# Patient Record
Sex: FEMALE | Race: WHITE | NOT HISPANIC OR LATINO | URBAN - METROPOLITAN AREA
[De-identification: names, ages, dates, MRNs, and addresses within clinical notes are randomized per-mention and may not be internally consistent; named-entity substitution may affect disease eponyms.]

---

## 2018-04-12 ENCOUNTER — OUTPATIENT (OUTPATIENT)
Dept: OUTPATIENT SERVICES | Facility: HOSPITAL | Age: 58
LOS: 1 days | End: 2018-04-12
Payer: COMMERCIAL

## 2018-04-12 ENCOUNTER — APPOINTMENT (OUTPATIENT)
Dept: MAMMOGRAPHY | Facility: CLINIC | Age: 58
End: 2018-04-12
Payer: COMMERCIAL

## 2018-04-12 ENCOUNTER — APPOINTMENT (OUTPATIENT)
Dept: ULTRASOUND IMAGING | Facility: CLINIC | Age: 58
End: 2018-04-12
Payer: COMMERCIAL

## 2018-04-12 DIAGNOSIS — Z00.8 ENCOUNTER FOR OTHER GENERAL EXAMINATION: ICD-10-CM

## 2018-04-12 PROCEDURE — 76641 ULTRASOUND BREAST COMPLETE: CPT

## 2018-04-12 PROCEDURE — 76641 ULTRASOUND BREAST COMPLETE: CPT | Mod: 26,50

## 2018-04-12 PROCEDURE — 77067 SCR MAMMO BI INCL CAD: CPT | Mod: 26

## 2018-04-12 PROCEDURE — 77063 BREAST TOMOSYNTHESIS BI: CPT

## 2018-04-12 PROCEDURE — 77063 BREAST TOMOSYNTHESIS BI: CPT | Mod: 26

## 2018-04-12 PROCEDURE — 77067 SCR MAMMO BI INCL CAD: CPT

## 2019-10-31 ENCOUNTER — INPATIENT (INPATIENT)
Facility: HOSPITAL | Age: 59
LOS: 0 days | Discharge: HOME | DRG: 392 | End: 2019-11-01
Attending: EMERGENCY MEDICINE
Payer: COMMERCIAL

## 2019-10-31 VITALS — HEIGHT: 66 IN | WEIGHT: 160.06 LBS

## 2019-10-31 LAB
ALBUMIN SERPL ELPH-MCNC: 4.3 G/DL — SIGNIFICANT CHANGE UP (ref 3.3–5)
ALP SERPL-CCNC: 63 U/L — SIGNIFICANT CHANGE UP (ref 40–120)
ALT FLD-CCNC: 32 U/L — SIGNIFICANT CHANGE UP (ref 12–78)
ANION GAP SERPL CALC-SCNC: 9 MMOL/L — SIGNIFICANT CHANGE UP (ref 5–17)
APTT BLD: 30.2 SEC — SIGNIFICANT CHANGE UP (ref 27.5–36.3)
AST SERPL-CCNC: 16 U/L — SIGNIFICANT CHANGE UP (ref 15–37)
BASOPHILS # BLD AUTO: 0.07 K/UL — SIGNIFICANT CHANGE UP (ref 0–0.2)
BASOPHILS NFR BLD AUTO: 0.4 % — SIGNIFICANT CHANGE UP (ref 0–2)
BILIRUB SERPL-MCNC: 0.8 MG/DL — SIGNIFICANT CHANGE UP (ref 0.2–1.2)
BUN SERPL-MCNC: 19 MG/DL — SIGNIFICANT CHANGE UP (ref 7–23)
CALCIUM SERPL-MCNC: 10 MG/DL — SIGNIFICANT CHANGE UP (ref 8.5–10.1)
CHLORIDE SERPL-SCNC: 103 MMOL/L — SIGNIFICANT CHANGE UP (ref 96–108)
CO2 SERPL-SCNC: 26 MMOL/L — SIGNIFICANT CHANGE UP (ref 22–31)
CREAT SERPL-MCNC: 0.94 MG/DL — SIGNIFICANT CHANGE UP (ref 0.5–1.3)
EOSINOPHIL # BLD AUTO: 0.37 K/UL — SIGNIFICANT CHANGE UP (ref 0–0.5)
EOSINOPHIL NFR BLD AUTO: 2.2 % — SIGNIFICANT CHANGE UP (ref 0–6)
GLUCOSE SERPL-MCNC: 124 MG/DL — HIGH (ref 70–99)
HCT VFR BLD CALC: 44 % — SIGNIFICANT CHANGE UP (ref 34.5–45)
HGB BLD-MCNC: 14.6 G/DL — SIGNIFICANT CHANGE UP (ref 11.5–15.5)
IMM GRANULOCYTES NFR BLD AUTO: 0.4 % — SIGNIFICANT CHANGE UP (ref 0–1.5)
INR BLD: 0.92 RATIO — SIGNIFICANT CHANGE UP (ref 0.88–1.16)
LIDOCAIN IGE QN: 116 U/L — SIGNIFICANT CHANGE UP (ref 73–393)
LYMPHOCYTES # BLD AUTO: 23.4 % — SIGNIFICANT CHANGE UP (ref 13–44)
LYMPHOCYTES # BLD AUTO: 3.91 K/UL — HIGH (ref 1–3.3)
MAGNESIUM SERPL-MCNC: 2.2 MG/DL — SIGNIFICANT CHANGE UP (ref 1.6–2.6)
MCHC RBC-ENTMCNC: 31.1 PG — SIGNIFICANT CHANGE UP (ref 27–34)
MCHC RBC-ENTMCNC: 33.2 GM/DL — SIGNIFICANT CHANGE UP (ref 32–36)
MCV RBC AUTO: 93.6 FL — SIGNIFICANT CHANGE UP (ref 80–100)
MONOCYTES # BLD AUTO: 1.25 K/UL — HIGH (ref 0–0.9)
MONOCYTES NFR BLD AUTO: 7.5 % — SIGNIFICANT CHANGE UP (ref 2–14)
NEUTROPHILS # BLD AUTO: 11.07 K/UL — HIGH (ref 1.8–7.4)
NEUTROPHILS NFR BLD AUTO: 66.1 % — SIGNIFICANT CHANGE UP (ref 43–77)
PLATELET # BLD AUTO: 276 K/UL — SIGNIFICANT CHANGE UP (ref 150–400)
POTASSIUM SERPL-MCNC: 3.6 MMOL/L — SIGNIFICANT CHANGE UP (ref 3.5–5.3)
POTASSIUM SERPL-SCNC: 3.6 MMOL/L — SIGNIFICANT CHANGE UP (ref 3.5–5.3)
PROT SERPL-MCNC: 7.9 GM/DL — SIGNIFICANT CHANGE UP (ref 6–8.3)
PROTHROM AB SERPL-ACNC: 10.2 SEC — SIGNIFICANT CHANGE UP (ref 10–12.9)
RBC # BLD: 4.7 M/UL — SIGNIFICANT CHANGE UP (ref 3.8–5.2)
RBC # FLD: 11.9 % — SIGNIFICANT CHANGE UP (ref 10.3–14.5)
SODIUM SERPL-SCNC: 138 MMOL/L — SIGNIFICANT CHANGE UP (ref 135–145)
TROPONIN I SERPL-MCNC: <0.015 NG/ML — SIGNIFICANT CHANGE UP (ref 0.01–0.04)
WBC # BLD: 16.74 K/UL — HIGH (ref 3.8–10.5)
WBC # FLD AUTO: 16.74 K/UL — HIGH (ref 3.8–10.5)

## 2019-10-31 PROCEDURE — 76705 ECHO EXAM OF ABDOMEN: CPT | Mod: 26

## 2019-10-31 PROCEDURE — 74177 CT ABD & PELVIS W/CONTRAST: CPT | Mod: 26

## 2019-10-31 PROCEDURE — 71046 X-RAY EXAM CHEST 2 VIEWS: CPT | Mod: 26

## 2019-10-31 PROCEDURE — 93010 ELECTROCARDIOGRAM REPORT: CPT

## 2019-10-31 RX ORDER — ONDANSETRON 8 MG/1
4 TABLET, FILM COATED ORAL ONCE
Refills: 0 | Status: COMPLETED | OUTPATIENT
Start: 2019-10-31 | End: 2019-10-31

## 2019-10-31 RX ORDER — MORPHINE SULFATE 50 MG/1
4 CAPSULE, EXTENDED RELEASE ORAL ONCE
Refills: 0 | Status: DISCONTINUED | OUTPATIENT
Start: 2019-10-31 | End: 2019-10-31

## 2019-10-31 RX ORDER — SODIUM CHLORIDE 9 MG/ML
1000 INJECTION INTRAMUSCULAR; INTRAVENOUS; SUBCUTANEOUS ONCE
Refills: 0 | Status: COMPLETED | OUTPATIENT
Start: 2019-10-31 | End: 2019-10-31

## 2019-10-31 RX ADMIN — MORPHINE SULFATE 4 MILLIGRAM(S): 50 CAPSULE, EXTENDED RELEASE ORAL at 23:00

## 2019-10-31 RX ADMIN — MORPHINE SULFATE 4 MILLIGRAM(S): 50 CAPSULE, EXTENDED RELEASE ORAL at 22:27

## 2019-10-31 RX ADMIN — SODIUM CHLORIDE 1000 MILLILITER(S): 9 INJECTION INTRAMUSCULAR; INTRAVENOUS; SUBCUTANEOUS at 22:28

## 2019-10-31 RX ADMIN — ONDANSETRON 4 MILLIGRAM(S): 8 TABLET, FILM COATED ORAL at 22:28

## 2019-10-31 RX ADMIN — SODIUM CHLORIDE 1000 MILLILITER(S): 9 INJECTION INTRAMUSCULAR; INTRAVENOUS; SUBCUTANEOUS at 23:28

## 2019-10-31 NOTE — ED PROVIDER NOTE - CARE PLAN
Principal Discharge DX:	SBO (small bowel obstruction)  Secondary Diagnosis:	Pelvic fluid collection  Secondary Diagnosis:	Leukocytosis

## 2019-10-31 NOTE — ED PROVIDER NOTE - NS ED ROS FT
Review of Systems:  	•	CONSTITUTIONAL: no fever  	•	SKIN: no rash  	•	RESPIRATORY: no shortness of breath  	•	CARDIAC: no chest pain, no palpitations  	•	GI:  abd pain, nausea, vomiting, no diarrhea  	•	GENITO-URINARY:  no dysuria; no hematuria    	•	MUSCULOSKELETAL:  no back pain  	•	NEUROLOGIC: no weakness  	•	ALLERGY: no rhinitis  	•	PSYSCHIATRIC: no anxiety

## 2019-10-31 NOTE — ED PROVIDER NOTE - PHYSICAL EXAMINATION
*GEN: No acute distress, well appearing; A+O x3   *HEAD: Normocephalic, Atraumatic  *EYES/NOSE: PERRL & EOMI b/l  *THROAT: airway patent, moist mucous membranes  *NECK: Neck supple, no masses  *PULMONARY: CTA b/l, symmetric breath sounds.   *CARDIAC: s1s2, regular rhythm, no Murmur  *ABDOMEN:  diffusely Tender, Non Distended, soft, no guarding, no rebound, no masses   *BACK: no CVA tenderness, Normal  spine   *EXTREMITIES: symmetric pulses, 2+ dp & radial pulses, capillary refill < 2 seconds, no cyanosis, no edema   *SKIN: no rash or bruising   *NEUROLOGIC: alert, CN 2-12 intact, moves all 4 extremities, full active & passive ROM in all extremities, normal baseline gait  *PSYCH: insight and judgment nl, memory nl, affect nl, thought nl

## 2019-10-31 NOTE — ED ADULT NURSE NOTE - OBJECTIVE STATEMENT
pt reports to ED with sudden onset of shooting abdominal pain, pt states pain is associated with nausea and vomiting, pt denies diarrhea, pt denies changes in her diet, pt has no other medical complaints at this time

## 2019-10-31 NOTE — ED PROVIDER NOTE - OBJECTIVE STATEMENT
Pertinent HPI/PMH/PSH/FHx/SHx and Review of Systems contained within  HPI:  59yoF  p/w CC abdominal pain x1day, diffuse, intermittent, cramping. also having n/v. no bm   PMH/PSH relevant for: none  ROS negative for: fever, Chest pain, SOB, , diarrhea, , dysuria    FamilyHx and SocialHx not otherwise contributory

## 2019-10-31 NOTE — ED ADULT TRIAGE NOTE - CHIEF COMPLAINT QUOTE
sudden onset of stabbing abdominal pain starting at 730PM with three episodes of vomiting, last episodes just prior to arrival.

## 2019-10-31 NOTE — ED ADULT NURSE NOTE - NSIMPLEMENTINTERV_GEN_ALL_ED
Implemented All Universal Safety Interventions:  [place] to call system. Call bell, personal items and telephone within reach. Instruct patient to call for assistance. Room bathroom lighting operational. Non-slip footwear when patient is off stretcher. Physically safe environment: no spills, clutter or unnecessary equipment. Stretcher in lowest position, wheels locked, appropriate side rails in place.

## 2019-11-01 ENCOUNTER — TRANSCRIPTION ENCOUNTER (OUTPATIENT)
Age: 59
End: 2019-11-01

## 2019-11-01 VITALS
OXYGEN SATURATION: 97 % | HEART RATE: 64 BPM | DIASTOLIC BLOOD PRESSURE: 68 MMHG | TEMPERATURE: 97 F | RESPIRATION RATE: 18 BRPM | SYSTOLIC BLOOD PRESSURE: 100 MMHG

## 2019-11-01 DIAGNOSIS — K56.609 UNSPECIFIED INTESTINAL OBSTRUCTION, UNSPECIFIED AS TO PARTIAL VERSUS COMPLETE OBSTRUCTION: ICD-10-CM

## 2019-11-01 DIAGNOSIS — D72.829 ELEVATED WHITE BLOOD CELL COUNT, UNSPECIFIED: ICD-10-CM

## 2019-11-01 LAB
ANION GAP SERPL CALC-SCNC: 6 MMOL/L — SIGNIFICANT CHANGE UP (ref 5–17)
APPEARANCE UR: CLEAR — SIGNIFICANT CHANGE UP
BASOPHILS # BLD AUTO: 0.04 K/UL — SIGNIFICANT CHANGE UP (ref 0–0.2)
BASOPHILS NFR BLD AUTO: 0.5 % — SIGNIFICANT CHANGE UP (ref 0–2)
BILIRUB UR-MCNC: NEGATIVE — SIGNIFICANT CHANGE UP
BUN SERPL-MCNC: 13 MG/DL — SIGNIFICANT CHANGE UP (ref 7–23)
CALCIUM SERPL-MCNC: 8.5 MG/DL — SIGNIFICANT CHANGE UP (ref 8.5–10.1)
CHLORIDE SERPL-SCNC: 109 MMOL/L — HIGH (ref 96–108)
CO2 SERPL-SCNC: 27 MMOL/L — SIGNIFICANT CHANGE UP (ref 22–31)
COLOR SPEC: YELLOW — SIGNIFICANT CHANGE UP
CREAT SERPL-MCNC: 0.75 MG/DL — SIGNIFICANT CHANGE UP (ref 0.5–1.3)
DIFF PNL FLD: ABNORMAL
EOSINOPHIL # BLD AUTO: 0.23 K/UL — SIGNIFICANT CHANGE UP (ref 0–0.5)
EOSINOPHIL NFR BLD AUTO: 2.9 % — SIGNIFICANT CHANGE UP (ref 0–6)
GLUCOSE SERPL-MCNC: 99 MG/DL — SIGNIFICANT CHANGE UP (ref 70–99)
GLUCOSE UR QL: NEGATIVE MG/DL — SIGNIFICANT CHANGE UP
HCT VFR BLD CALC: 37.7 % — SIGNIFICANT CHANGE UP (ref 34.5–45)
HCV AB S/CO SERPL IA: 0.08 S/CO — SIGNIFICANT CHANGE UP (ref 0–0.99)
HCV AB SERPL-IMP: SIGNIFICANT CHANGE UP
HGB BLD-MCNC: 12.1 G/DL — SIGNIFICANT CHANGE UP (ref 11.5–15.5)
IMM GRANULOCYTES NFR BLD AUTO: 0.4 % — SIGNIFICANT CHANGE UP (ref 0–1.5)
KETONES UR-MCNC: ABNORMAL
LACTATE SERPL-SCNC: 1.6 MMOL/L — SIGNIFICANT CHANGE UP (ref 0.7–2)
LEUKOCYTE ESTERASE UR-ACNC: ABNORMAL
LYMPHOCYTES # BLD AUTO: 2.66 K/UL — SIGNIFICANT CHANGE UP (ref 1–3.3)
LYMPHOCYTES # BLD AUTO: 33.4 % — SIGNIFICANT CHANGE UP (ref 13–44)
MAGNESIUM SERPL-MCNC: 2.4 MG/DL — SIGNIFICANT CHANGE UP (ref 1.6–2.6)
MCHC RBC-ENTMCNC: 30.9 PG — SIGNIFICANT CHANGE UP (ref 27–34)
MCHC RBC-ENTMCNC: 32.1 GM/DL — SIGNIFICANT CHANGE UP (ref 32–36)
MCV RBC AUTO: 96.4 FL — SIGNIFICANT CHANGE UP (ref 80–100)
MONOCYTES # BLD AUTO: 0.88 K/UL — SIGNIFICANT CHANGE UP (ref 0–0.9)
MONOCYTES NFR BLD AUTO: 11 % — SIGNIFICANT CHANGE UP (ref 2–14)
NEUTROPHILS # BLD AUTO: 4.13 K/UL — SIGNIFICANT CHANGE UP (ref 1.8–7.4)
NEUTROPHILS NFR BLD AUTO: 51.8 % — SIGNIFICANT CHANGE UP (ref 43–77)
NITRITE UR-MCNC: NEGATIVE — SIGNIFICANT CHANGE UP
PH UR: 5 — SIGNIFICANT CHANGE UP (ref 5–8)
PHOSPHATE SERPL-MCNC: 3.5 MG/DL — SIGNIFICANT CHANGE UP (ref 2.5–4.5)
PLATELET # BLD AUTO: 237 K/UL — SIGNIFICANT CHANGE UP (ref 150–400)
POTASSIUM SERPL-MCNC: 4 MMOL/L — SIGNIFICANT CHANGE UP (ref 3.5–5.3)
POTASSIUM SERPL-SCNC: 4 MMOL/L — SIGNIFICANT CHANGE UP (ref 3.5–5.3)
PROT UR-MCNC: 30 MG/DL
RBC # BLD: 3.91 M/UL — SIGNIFICANT CHANGE UP (ref 3.8–5.2)
RBC # FLD: 12.2 % — SIGNIFICANT CHANGE UP (ref 10.3–14.5)
SODIUM SERPL-SCNC: 142 MMOL/L — SIGNIFICANT CHANGE UP (ref 135–145)
SP GR SPEC: 1.02 — SIGNIFICANT CHANGE UP (ref 1.01–1.02)
UROBILINOGEN FLD QL: NEGATIVE MG/DL — SIGNIFICANT CHANGE UP
WBC # BLD: 7.97 K/UL — SIGNIFICANT CHANGE UP (ref 3.8–10.5)
WBC # FLD AUTO: 7.97 K/UL — SIGNIFICANT CHANGE UP (ref 3.8–10.5)

## 2019-11-01 PROCEDURE — 85025 COMPLETE CBC W/AUTO DIFF WBC: CPT

## 2019-11-01 PROCEDURE — 80048 BASIC METABOLIC PNL TOTAL CA: CPT

## 2019-11-01 PROCEDURE — 74176 CT ABD & PELVIS W/O CONTRAST: CPT

## 2019-11-01 PROCEDURE — 84100 ASSAY OF PHOSPHORUS: CPT

## 2019-11-01 PROCEDURE — 86803 HEPATITIS C AB TEST: CPT

## 2019-11-01 PROCEDURE — 36415 COLL VENOUS BLD VENIPUNCTURE: CPT

## 2019-11-01 PROCEDURE — 99223 1ST HOSP IP/OBS HIGH 75: CPT

## 2019-11-01 PROCEDURE — 76830 TRANSVAGINAL US NON-OB: CPT | Mod: 26

## 2019-11-01 PROCEDURE — 74176 CT ABD & PELVIS W/O CONTRAST: CPT | Mod: 26

## 2019-11-01 PROCEDURE — 83735 ASSAY OF MAGNESIUM: CPT

## 2019-11-01 RX ORDER — FAMOTIDINE 10 MG/ML
20 INJECTION INTRAVENOUS ONCE
Refills: 0 | Status: COMPLETED | OUTPATIENT
Start: 2019-11-01 | End: 2019-11-01

## 2019-11-01 RX ORDER — ACETAMINOPHEN 500 MG
650 TABLET ORAL EVERY 6 HOURS
Refills: 0 | Status: DISCONTINUED | OUTPATIENT
Start: 2019-11-01 | End: 2019-11-01

## 2019-11-01 RX ORDER — DEXTROSE MONOHYDRATE, SODIUM CHLORIDE, AND POTASSIUM CHLORIDE 50; .745; 4.5 G/1000ML; G/1000ML; G/1000ML
1000 INJECTION, SOLUTION INTRAVENOUS
Refills: 0 | Status: DISCONTINUED | OUTPATIENT
Start: 2019-11-01 | End: 2019-11-01

## 2019-11-01 RX ORDER — ACETAMINOPHEN 500 MG
975 TABLET ORAL ONCE
Refills: 0 | Status: COMPLETED | OUTPATIENT
Start: 2019-11-01 | End: 2019-11-01

## 2019-11-01 RX ORDER — MORPHINE SULFATE 50 MG/1
4 CAPSULE, EXTENDED RELEASE ORAL ONCE
Refills: 0 | Status: DISCONTINUED | OUTPATIENT
Start: 2019-11-01 | End: 2019-11-01

## 2019-11-01 RX ORDER — ENOXAPARIN SODIUM 100 MG/ML
40 INJECTION SUBCUTANEOUS DAILY
Refills: 0 | Status: DISCONTINUED | OUTPATIENT
Start: 2019-11-01 | End: 2019-11-01

## 2019-11-01 RX ADMIN — Medication 975 MILLIGRAM(S): at 00:19

## 2019-11-01 RX ADMIN — MORPHINE SULFATE 4 MILLIGRAM(S): 50 CAPSULE, EXTENDED RELEASE ORAL at 00:19

## 2019-11-01 RX ADMIN — FAMOTIDINE 20 MILLIGRAM(S): 10 INJECTION INTRAVENOUS at 00:19

## 2019-11-01 RX ADMIN — Medication 975 MILLIGRAM(S): at 01:19

## 2019-11-01 RX ADMIN — MORPHINE SULFATE 4 MILLIGRAM(S): 50 CAPSULE, EXTENDED RELEASE ORAL at 00:45

## 2019-11-01 NOTE — H&P ADULT - NSHPLABSRESULTS_GEN_ALL_CORE
WBC = 17  HCO3 = 26    CT abd/pelvis w/ contrast - WBC = 17  HCO3 = 26    CT abd/pelvis w/ contrast - SBO with the following findings concerning for closed-loop:  1. relatively collapsed stomach and proximal jejunum  2. moderate volume of pelvic ascites and some mesenteric edema  3. two transition points (proximal is axial 39, sagittal 90, coronal 27) (distal is axial 92, sagittal 55, coronal 50) WBC = 17  HCO3 = 26  lactate = 1.6    CT abd/pelvis w/ contrast - SBO with the following findings concerning for closed-loop:  1. relatively collapsed stomach and proximal jejunum  2. moderate volume of pelvic ascites and some mesenteric edema  3. two transition points (proximal is axial 39, sagittal 90, coronal 27) (distal is axial 92, sagittal 55, coronal 50)

## 2019-11-01 NOTE — PROGRESS NOTE ADULT - SUBJECTIVE AND OBJECTIVE BOX
CC: Patient is a 59y old  Female who presents with a chief complaint of small bowel obstruction (01 Nov 2019 07:24)      Subjective:  tolerating clears w/o nausea or vomiting  significant amount of flatus this morning  no recurrence of abdominal pain      Physical Exam:  Vital Signs Last 24 Hrs  T(C): 36.2 (01 Nov 2019 07:34), Max: 37.1 (31 Oct 2019 22:07)  T(F): 97.1 (01 Nov 2019 07:34), Max: 98.7 (31 Oct 2019 22:07)  HR: 64 (01 Nov 2019 07:34) (64 - 89)  BP: 100/68 (01 Nov 2019 07:34) (100/68 - 146/86)  BP(mean): --  RR: 18 (01 Nov 2019 07:34) (18 - 18)  SpO2: 97% (01 Nov 2019 07:34) (97% - 98%)    soft / NT / ND      Labs:                        12.1   7.97  )-----------( 237      ( 01 Nov 2019 08:00 )             37.7     PT/INR - ( 31 Oct 2019 22:30 )   PT: 10.2 sec;   INR: 0.92 ratio         PTT - ( 31 Oct 2019 22:30 )  PTT:30.2 sec  11-01    142  |  109<H>  |  13  ----------------------------<  99  4.0   |  27  |  0.75    Ca    8.5      01 Nov 2019 08:00  Phos  3.5     11-01  Mg     2.4     11-01    TPro  7.9  /  Alb  4.3  /  TBili  0.8  /  DBili  x   /  AST  16  /  ALT  32  /  AlkPhos  63  10-31    LIVER FUNCTIONS - ( 31 Oct 2019 22:30 )  Alb: 4.3 g/dL / Pro: 7.9 gm/dL / ALK PHOS: 63 U/L / ALT: 32 U/L / AST: 16 U/L / GGT: x           Bilirubin Total, Serum: 0.8 mg/dL (10-31-19 @ 22:30)        Microbiology:      Radiology:  CT abd/pelvis w/o contrast - oral contrast has reached colon, and there is no evidence of SBO      Meds:  MEDICATIONS  (STANDING):  enoxaparin Injectable 40 milliGRAM(s) SubCutaneous daily    MEDICATIONS  (PRN):  acetaminophen   Tablet .. 650 milliGRAM(s) Oral every 6 hours PRN Mild Pain (1 - 3)

## 2019-11-01 NOTE — ED ADULT NURSE REASSESSMENT NOTE - NS ED NURSE REASSESS COMMENT FT1
Pt received from previous RN.  Pt aaox3  No c/o pain or discomfort, in no apparent distress.  Plan of care, transition from ED to admitted status discussed with pt.  per dr morse, pt will eat breakfast and, if tolerated, be discharged.  All questions answered to pt satisfaction. breakfast ordered  Call bell given to patient.  No additional needs at this time, will continue hourly rounding.

## 2019-11-01 NOTE — DISCHARGE NOTE PROVIDER - NSDCCPCAREPLAN_GEN_ALL_CORE_FT
PRINCIPAL DISCHARGE DIAGNOSIS  Diagnosis: SBO (small bowel obstruction)  Assessment and Plan of Treatment: this was likely secondary to gastroenteritis and resolved without treatment

## 2019-11-01 NOTE — DISCHARGE NOTE PROVIDER - CARE PROVIDER_API CALL
Riky Plasencia)  Gastroenterology; Internal Medicine  [address] [place] [address]  Phone: [phone]  Fax: [phone]  Follow Up Time: 1-3 days

## 2019-11-01 NOTE — DISCHARGE NOTE NURSING/CASE MANAGEMENT/SOCIAL WORK - PATIENT PORTAL LINK FT
You can access the FollowMyHealth Patient Portal offered by [facility] by registering at the following website: [email or web address]. By joining [employer]’s FollowMyHealth portal, you will also be able to view your health information using other applications (apps) compatible with our system.

## 2019-11-01 NOTE — H&P ADULT - NSHPPHYSICALEXAM_GEN_ALL_CORE
A+Ox3  NAD  RRR w/o murmurs  CTA bilat  soft / NT / ND  no surgical scars on abdomen  hyperactive (nearly constant), but normal pitched bowel sounds  no pedal edema  2+ bilateral radial and DP pulses

## 2019-11-01 NOTE — H&P ADULT - PROBLEM SELECTOR PLAN 1
I explained my concern for closed-loop SBO, but her complete resolution of symptoms 1 hour ago and benign abdominal exam (albeit after morphine) suggest against closed-loop SBO. I offered laparoscopic lysis of adhesions, possible laparotomy, possible bowel resection at this time since the risk of this procedure is negligible compared to delay in treatment of a closed-loop SBO.    She preferred observation. I will stop morphine, have her drink oral contrast and repeat CT abd/pelvis w/o contrast 2 hours after completing the oral contrast to better evaluate for a closed-loop SBO. I provided her with my cell phone number and asked her to call me if she develops recurrent abdominal pain, nausea or vomiting prior to CT scan. I explained my concern for closed-loop SBO, but her complete resolution of symptoms 1 hour ago and benign abdominal exam (albeit after morphine) suggest against closed-loop SBO. She also has no metabolic acidosis and a normal lactate. Despite the inconclusive diagnosis, I offered laparoscopic lysis of adhesions, possible laparotomy, possible bowel resection at this time since the risk of this procedure is negligible compared to delay in treatment of a closed-loop SBO.    She preferred observation. I will stop morphine, have her drink oral contrast and repeat CT abd/pelvis w/o contrast 2 hours after completing the oral contrast to better evaluate for a closed-loop SBO. I provided her with my cell phone number and asked her to call me if she develops recurrent abdominal pain, nausea or vomiting prior to CT scan.

## 2019-11-01 NOTE — PROGRESS NOTE ADULT - ASSESSMENT
SBO symptoms and CT scan findings were most likely secondary to viral gastroenteritis  -discharge home if she tolerates a regular diet

## 2019-11-01 NOTE — H&P ADULT - HISTORY OF PRESENT ILLNESS
seen and examined 11-01-19 @ 0145    c/o severe crampy epigastric pain at 10 minute frequency since 7pm last night.  associated with non-bloody, non-bilious vomiting.  last BM was yesterday afternoon, but no diarrhea and no history of hematochezia    She got morphine 4 mg IV x2 (2227 and 0019). She has no abdominal pain, nausea or vomiting for the past hour.    Of note:  -She had a dental cleaning yesterday morning and felt unwell after.  -She also had Italian wedding soup at [employer] for lunch.  -She also took a large [employer] Tumeric pill (for right knee arthritis) which she has never taken before.  -She had a cortisone injection in the right knee a few days ago.    She had a normal screening colonoscopy by Dr Riky Plasencia in 2016.

## 2019-11-01 NOTE — DISCHARGE NOTE PROVIDER - HOSPITAL COURSE
60 y/o female with prior abdominal surgery who was admitted on 10/31/2019 for concern of closed-loop SBO on CT abd/pelvis. Her symptoms completely resolved on 11/1 and a CT scan was repeated which demonstrated oral contrast in the colon.

## 2019-11-01 NOTE — H&P ADULT - NSICDXFAMILYHX_GEN_ALL_CORE_FT
FAMILY HISTORY:  FHx: coronary artery disease, father  FHx: lung cancer, mother who smoked cigarettes

## 2019-11-02 LAB
CULTURE RESULTS: SIGNIFICANT CHANGE UP
SPECIMEN SOURCE: SIGNIFICANT CHANGE UP

## 2019-11-04 ENCOUNTER — INBOUND DOCUMENT (OUTPATIENT)
Age: 59
End: 2019-11-04

## 2019-11-04 PROBLEM — M17.11 UNILATERAL PRIMARY OSTEOARTHRITIS, RIGHT KNEE: Chronic | Status: ACTIVE | Noted: 2019-11-01

## 2019-11-05 ENCOUNTER — APPOINTMENT (OUTPATIENT)
Dept: GASTROENTEROLOGY | Facility: CLINIC | Age: 59
End: 2019-11-05
Payer: COMMERCIAL

## 2019-11-05 VITALS
WEIGHT: 160 LBS | DIASTOLIC BLOOD PRESSURE: 77 MMHG | SYSTOLIC BLOOD PRESSURE: 120 MMHG | HEART RATE: 69 BPM | HEIGHT: 66.5 IN | BODY MASS INDEX: 25.41 KG/M2

## 2019-11-05 DIAGNOSIS — Z80.1 FAMILY HISTORY OF MALIGNANT NEOPLASM OF TRACHEA, BRONCHUS AND LUNG: ICD-10-CM

## 2019-11-05 DIAGNOSIS — R10.30 LOWER ABDOMINAL PAIN, UNSPECIFIED: ICD-10-CM

## 2019-11-05 DIAGNOSIS — Z78.9 OTHER SPECIFIED HEALTH STATUS: ICD-10-CM

## 2019-11-05 DIAGNOSIS — R93.5 ABNORMAL FINDINGS ON DIAGNOSTIC IMAGING OF OTHER ABDOMINAL REGIONS, INCLUDING RETROPERITONEUM: ICD-10-CM

## 2019-11-05 DIAGNOSIS — A08.4 VIRAL INTESTINAL INFECTION, UNSPECIFIED: ICD-10-CM

## 2019-11-05 PROCEDURE — 99202 OFFICE O/P NEW SF 15 MIN: CPT

## 2019-11-07 PROBLEM — R10.30 LOWER ABDOMINAL PAIN: Status: ACTIVE | Noted: 2019-11-07

## 2019-11-07 PROBLEM — R93.5 ABNORMAL CT OF THE ABDOMEN: Status: ACTIVE | Noted: 2019-11-07

## 2019-11-07 PROBLEM — Z78.9 CAFFEINE USE: Status: ACTIVE | Noted: 2019-11-05

## 2019-11-07 PROBLEM — Z80.1 FAMILY HISTORY OF LUNG CANCER: Status: ACTIVE | Noted: 2019-11-05

## 2019-11-07 NOTE — REVIEW OF SYSTEMS
[Abdominal Pain] : abdominal pain [As Noted in HPI] : as noted in HPI [Diarrhea] : diarrhea [Negative] : Heme/Lymph

## 2019-11-07 NOTE — PHYSICAL EXAM
[General Appearance - Alert] : alert [General Appearance - In No Acute Distress] : in no acute distress [Sclera] : the sclera and conjunctiva were normal [PERRL With Normal Accommodation] : pupils were equal in size, round, and reactive to light [Oropharynx] : the oropharynx was normal [Outer Ear] : the ears and nose were normal in appearance [Extraocular Movements] : extraocular movements were intact [Neck Appearance] : the appearance of the neck was normal [Neck Cervical Mass (___cm)] : no neck mass was observed [Jugular Venous Distention Increased] : there was no jugular-venous distention [Thyroid Diffuse Enlargement] : the thyroid was not enlarged [Thyroid Nodule] : there were no palpable thyroid nodules [Auscultation Breath Sounds / Voice Sounds] : lungs were clear to auscultation bilaterally [Heart Rate And Rhythm] : heart rate was normal and rhythm regular [Heart Sounds] : normal S1 and S2 [Murmurs] : no murmurs [Heart Sounds Gallop] : no gallops [Heart Sounds Pericardial Friction Rub] : no pericardial rub [Bowel Sounds] : normal bowel sounds [Abdomen Tenderness] : non-tender [] : no hepato-splenomegaly [Abdomen Soft] : soft [Abdomen Mass (___ Cm)] : no abdominal mass palpated [Nail Clubbing] : no clubbing  or cyanosis of the fingernails [Abnormal Walk] : normal gait [Musculoskeletal - Swelling] : no joint swelling seen [Motor Tone] : muscle strength and tone were normal [Impaired Insight] : insight and judgment were intact [Oriented To Time, Place, And Person] : oriented to person, place, and time [Affect] : the affect was normal

## 2019-11-07 NOTE — HISTORY OF PRESENT ILLNESS
[de-identified] : 58yo female seen for evaluation s/p urgent ER visit\par Last week, she developed sudden onset of severe lower abdominal pain 10/10 with associated bloating nausea and vomiting.  She went to the ER and initially diagnosed with small bowel obstruction.  She started to feel better and repeat CT scan was normal\par She did have leukocytosis\par \par She had diarrhea for few days after but now largely improved with only mild residual soreness\par There were no clear inciting factors.

## 2019-11-12 ENCOUNTER — INBOUND DOCUMENT (OUTPATIENT)
Age: 59
End: 2019-11-12

## 2021-04-30 ENCOUNTER — APPOINTMENT (OUTPATIENT)
Dept: FAMILY MEDICINE | Facility: CLINIC | Age: 61
End: 2021-04-30
Payer: COMMERCIAL

## 2021-04-30 VITALS
HEIGHT: 66 IN | SYSTOLIC BLOOD PRESSURE: 120 MMHG | WEIGHT: 160 LBS | TEMPERATURE: 96.3 F | HEART RATE: 67 BPM | BODY MASS INDEX: 25.71 KG/M2 | OXYGEN SATURATION: 98 % | DIASTOLIC BLOOD PRESSURE: 80 MMHG

## 2021-04-30 DIAGNOSIS — J30.9 ALLERGIC RHINITIS, UNSPECIFIED: ICD-10-CM

## 2021-04-30 PROCEDURE — 99213 OFFICE O/P EST LOW 20 MIN: CPT | Mod: 25

## 2021-04-30 PROCEDURE — 99072 ADDL SUPL MATRL&STAF TM PHE: CPT

## 2021-04-30 PROCEDURE — 96372 THER/PROPH/DIAG INJ SC/IM: CPT

## 2021-04-30 RX ADMIN — TRIAMCINOLONE ACETONIDE 0 MG/ML: 40 INJECTION, SUSPENSION INTRA-ARTICULAR; INTRAMUSCULAR at 00:00

## 2021-04-30 NOTE — PLAN
[FreeTextEntry1] : \par \par Advised to continue with her Antihistamine protocol , I have added Patanase , and administered Kenalog IM today \par

## 2021-04-30 NOTE — HISTORY OF PRESENT ILLNESS
[FreeTextEntry8] : Patient is here with complaint of seasonal allergies, SHe is experiencing Allergic Rhinitis , PND , watery and itchy eyes ,as well as eye edema in last 2 days \par She is currently taking oral antihistamine, saline and flonase spray, patanol eye drops, benadryl at bedtime.  She states that she has had worsening symptoms despite all her treatments\par She is requesting steroids as this has helped in past

## 2021-04-30 NOTE — REVIEW OF SYSTEMS
soft tissue and bone [Discharge] : discharge [Redness] : redness [Nasal Discharge] : nasal discharge [Postnasal Drip] : postnasal drip [Negative] : Gastrointestinal

## 2021-04-30 NOTE — PHYSICAL EXAM
[Normal] : no posterior cervical lymphadenopathy and no anterior cervical lymphadenopathy [de-identified] : edematous mucousa ,

## 2021-05-20 RX ORDER — TRIAMCINOLONE ACETONIDE 40 MG/ML
40 SUSPENSION INTRA-ARTERIAL; INTRAMUSCULAR
Qty: 1 | Refills: 0 | Status: COMPLETED | OUTPATIENT
Start: 2021-04-30

## 2022-02-08 ENCOUNTER — APPOINTMENT (OUTPATIENT)
Dept: ULTRASOUND IMAGING | Facility: CLINIC | Age: 62
End: 2022-02-08
Payer: COMMERCIAL

## 2022-02-08 ENCOUNTER — APPOINTMENT (OUTPATIENT)
Dept: MAMMOGRAPHY | Facility: CLINIC | Age: 62
End: 2022-02-08
Payer: COMMERCIAL

## 2022-02-08 ENCOUNTER — OUTPATIENT (OUTPATIENT)
Dept: OUTPATIENT SERVICES | Facility: HOSPITAL | Age: 62
LOS: 1 days | End: 2022-02-08
Payer: COMMERCIAL

## 2022-02-08 ENCOUNTER — APPOINTMENT (OUTPATIENT)
Dept: RADIOLOGY | Facility: CLINIC | Age: 62
End: 2022-02-08
Payer: COMMERCIAL

## 2022-02-08 DIAGNOSIS — Z01.419 ENCOUNTER FOR GYNECOLOGICAL EXAMINATION (GENERAL) (ROUTINE) WITHOUT ABNORMAL FINDINGS: ICD-10-CM

## 2022-02-08 PROCEDURE — 77067 SCR MAMMO BI INCL CAD: CPT | Mod: 26

## 2022-02-08 PROCEDURE — 77063 BREAST TOMOSYNTHESIS BI: CPT

## 2022-02-08 PROCEDURE — 77063 BREAST TOMOSYNTHESIS BI: CPT | Mod: 26

## 2022-02-08 PROCEDURE — 77080 DXA BONE DENSITY AXIAL: CPT | Mod: 26

## 2022-02-08 PROCEDURE — 76641 ULTRASOUND BREAST COMPLETE: CPT | Mod: 26,50

## 2022-02-08 PROCEDURE — 76641 ULTRASOUND BREAST COMPLETE: CPT

## 2022-02-08 PROCEDURE — 77067 SCR MAMMO BI INCL CAD: CPT

## 2022-02-08 PROCEDURE — 77080 DXA BONE DENSITY AXIAL: CPT

## 2022-07-18 ENCOUNTER — NON-APPOINTMENT (OUTPATIENT)
Age: 62
End: 2022-07-18

## 2022-07-19 ENCOUNTER — NON-APPOINTMENT (OUTPATIENT)
Age: 62
End: 2022-07-19

## 2022-07-19 ENCOUNTER — EMERGENCY (EMERGENCY)
Facility: HOSPITAL | Age: 62
LOS: 0 days | Discharge: HOME | End: 2022-07-19
Attending: EMERGENCY MEDICINE
Payer: COMMERCIAL

## 2022-07-19 VITALS — HEIGHT: 66 IN | WEIGHT: 160.06 LBS

## 2022-07-19 VITALS
SYSTOLIC BLOOD PRESSURE: 114 MMHG | TEMPERATURE: 98 F | OXYGEN SATURATION: 98 % | HEART RATE: 68 BPM | RESPIRATION RATE: 18 BRPM | DIASTOLIC BLOOD PRESSURE: 79 MMHG

## 2022-07-19 DIAGNOSIS — R42 DIZZINESS AND GIDDINESS: ICD-10-CM

## 2022-07-19 DIAGNOSIS — R07.9 CHEST PAIN, UNSPECIFIED: ICD-10-CM

## 2022-07-19 DIAGNOSIS — M17.11 UNILATERAL PRIMARY OSTEOARTHRITIS, RIGHT KNEE: ICD-10-CM

## 2022-07-19 DIAGNOSIS — R51.9 HEADACHE, UNSPECIFIED: ICD-10-CM

## 2022-07-19 LAB
ALBUMIN SERPL ELPH-MCNC: 4 G/DL — SIGNIFICANT CHANGE UP (ref 3.3–5)
ALP SERPL-CCNC: 52 U/L — SIGNIFICANT CHANGE UP (ref 40–120)
ALT FLD-CCNC: 41 U/L — SIGNIFICANT CHANGE UP (ref 12–78)
ANION GAP SERPL CALC-SCNC: 5 MMOL/L — SIGNIFICANT CHANGE UP (ref 5–17)
APPEARANCE UR: CLEAR — SIGNIFICANT CHANGE UP
AST SERPL-CCNC: 20 U/L — SIGNIFICANT CHANGE UP (ref 15–37)
BASOPHILS # BLD AUTO: 0.06 K/UL — SIGNIFICANT CHANGE UP (ref 0–0.2)
BASOPHILS NFR BLD AUTO: 0.9 % — SIGNIFICANT CHANGE UP (ref 0–2)
BILIRUB SERPL-MCNC: 0.5 MG/DL — SIGNIFICANT CHANGE UP (ref 0.2–1.2)
BILIRUB UR-MCNC: NEGATIVE — SIGNIFICANT CHANGE UP
BUN SERPL-MCNC: 21 MG/DL — SIGNIFICANT CHANGE UP (ref 7–23)
CALCIUM SERPL-MCNC: 9.3 MG/DL — SIGNIFICANT CHANGE UP (ref 8.5–10.1)
CHLORIDE SERPL-SCNC: 107 MMOL/L — SIGNIFICANT CHANGE UP (ref 96–108)
CO2 SERPL-SCNC: 25 MMOL/L — SIGNIFICANT CHANGE UP (ref 22–31)
COLOR SPEC: YELLOW — SIGNIFICANT CHANGE UP
CREAT SERPL-MCNC: 0.83 MG/DL — SIGNIFICANT CHANGE UP (ref 0.5–1.3)
DIFF PNL FLD: NEGATIVE — SIGNIFICANT CHANGE UP
EGFR: 80 ML/MIN/1.73M2 — SIGNIFICANT CHANGE UP
EOSINOPHIL # BLD AUTO: 0.14 K/UL — SIGNIFICANT CHANGE UP (ref 0–0.5)
EOSINOPHIL NFR BLD AUTO: 2 % — SIGNIFICANT CHANGE UP (ref 0–6)
GLUCOSE SERPL-MCNC: 108 MG/DL — HIGH (ref 70–99)
GLUCOSE UR QL: NEGATIVE — SIGNIFICANT CHANGE UP
HCT VFR BLD CALC: 40.3 % — SIGNIFICANT CHANGE UP (ref 34.5–45)
HGB BLD-MCNC: 13.6 G/DL — SIGNIFICANT CHANGE UP (ref 11.5–15.5)
IMM GRANULOCYTES NFR BLD AUTO: 0.3 % — SIGNIFICANT CHANGE UP (ref 0–1.5)
KETONES UR-MCNC: NEGATIVE — SIGNIFICANT CHANGE UP
LEUKOCYTE ESTERASE UR-ACNC: NEGATIVE — SIGNIFICANT CHANGE UP
LYMPHOCYTES # BLD AUTO: 2.31 K/UL — SIGNIFICANT CHANGE UP (ref 1–3.3)
LYMPHOCYTES # BLD AUTO: 33.6 % — SIGNIFICANT CHANGE UP (ref 13–44)
MAGNESIUM SERPL-MCNC: 2.4 MG/DL — SIGNIFICANT CHANGE UP (ref 1.6–2.6)
MCHC RBC-ENTMCNC: 31.8 PG — SIGNIFICANT CHANGE UP (ref 27–34)
MCHC RBC-ENTMCNC: 33.7 GM/DL — SIGNIFICANT CHANGE UP (ref 32–36)
MCV RBC AUTO: 94.2 FL — SIGNIFICANT CHANGE UP (ref 80–100)
MONOCYTES # BLD AUTO: 0.7 K/UL — SIGNIFICANT CHANGE UP (ref 0–0.9)
MONOCYTES NFR BLD AUTO: 10.2 % — SIGNIFICANT CHANGE UP (ref 2–14)
NEUTROPHILS # BLD AUTO: 3.64 K/UL — SIGNIFICANT CHANGE UP (ref 1.8–7.4)
NEUTROPHILS NFR BLD AUTO: 53 % — SIGNIFICANT CHANGE UP (ref 43–77)
NITRITE UR-MCNC: NEGATIVE — SIGNIFICANT CHANGE UP
PH UR: 5 — SIGNIFICANT CHANGE UP (ref 5–8)
PHOSPHATE SERPL-MCNC: 3.5 MG/DL — SIGNIFICANT CHANGE UP (ref 2.5–4.5)
PLATELET # BLD AUTO: 232 K/UL — SIGNIFICANT CHANGE UP (ref 150–400)
POTASSIUM SERPL-MCNC: 3.9 MMOL/L — SIGNIFICANT CHANGE UP (ref 3.5–5.3)
POTASSIUM SERPL-SCNC: 3.9 MMOL/L — SIGNIFICANT CHANGE UP (ref 3.5–5.3)
PROT SERPL-MCNC: 7.5 GM/DL — SIGNIFICANT CHANGE UP (ref 6–8.3)
PROT UR-MCNC: NEGATIVE — SIGNIFICANT CHANGE UP
RBC # BLD: 4.28 M/UL — SIGNIFICANT CHANGE UP (ref 3.8–5.2)
RBC # FLD: 12.1 % — SIGNIFICANT CHANGE UP (ref 10.3–14.5)
SODIUM SERPL-SCNC: 137 MMOL/L — SIGNIFICANT CHANGE UP (ref 135–145)
SP GR SPEC: 1.02 — SIGNIFICANT CHANGE UP (ref 1.01–1.02)
TROPONIN I, HIGH SENSITIVITY RESULT: 31.35 NG/L — SIGNIFICANT CHANGE UP
UROBILINOGEN FLD QL: NEGATIVE — SIGNIFICANT CHANGE UP
WBC # BLD: 6.87 K/UL — SIGNIFICANT CHANGE UP (ref 3.8–10.5)
WBC # FLD AUTO: 6.87 K/UL — SIGNIFICANT CHANGE UP (ref 3.8–10.5)

## 2022-07-19 PROCEDURE — 80053 COMPREHEN METABOLIC PANEL: CPT

## 2022-07-19 PROCEDURE — 93005 ELECTROCARDIOGRAM TRACING: CPT

## 2022-07-19 PROCEDURE — 87086 URINE CULTURE/COLONY COUNT: CPT

## 2022-07-19 PROCEDURE — 99284 EMERGENCY DEPT VISIT MOD MDM: CPT

## 2022-07-19 PROCEDURE — 71046 X-RAY EXAM CHEST 2 VIEWS: CPT

## 2022-07-19 PROCEDURE — 93010 ELECTROCARDIOGRAM REPORT: CPT

## 2022-07-19 PROCEDURE — 99285 EMERGENCY DEPT VISIT HI MDM: CPT | Mod: 25

## 2022-07-19 PROCEDURE — 85025 COMPLETE CBC W/AUTO DIFF WBC: CPT

## 2022-07-19 PROCEDURE — 71046 X-RAY EXAM CHEST 2 VIEWS: CPT | Mod: 26

## 2022-07-19 PROCEDURE — 83735 ASSAY OF MAGNESIUM: CPT

## 2022-07-19 PROCEDURE — 84484 ASSAY OF TROPONIN QUANT: CPT

## 2022-07-19 PROCEDURE — 84100 ASSAY OF PHOSPHORUS: CPT

## 2022-07-19 PROCEDURE — 81003 URINALYSIS AUTO W/O SCOPE: CPT

## 2022-07-19 PROCEDURE — 36415 COLL VENOUS BLD VENIPUNCTURE: CPT

## 2022-07-19 RX ORDER — SODIUM CHLORIDE 9 MG/ML
1000 INJECTION INTRAMUSCULAR; INTRAVENOUS; SUBCUTANEOUS ONCE
Refills: 0 | Status: COMPLETED | OUTPATIENT
Start: 2022-07-19 | End: 2022-07-19

## 2022-07-19 RX ADMIN — SODIUM CHLORIDE 2000 MILLILITER(S): 9 INJECTION INTRAMUSCULAR; INTRAVENOUS; SUBCUTANEOUS at 15:39

## 2022-07-19 NOTE — ED STATDOCS - PATIENT PORTAL LINK FT
You can access the FollowMyHealth Patient Portal offered by [facility] by registering at the following website: [email or web address]. By joining [employer]’s FollowMyHealth portal, you will also be able to view your health information using other applications (apps) compatible with our system.

## 2022-07-19 NOTE — ED ADULT NURSE NOTE - NS ED NURSE RECORD ANOTHER VITAL SIGN
60 Ventricular Rate BPM  49 Atrial Rate BPM  194 QRS Duration ms  544 Q-T Interval ms  544 QTC Calculation(Bazett) ms  -78 R Axis degrees  101 T Axis degrees  Ventricular-paced rhythm  Abnormal ECG  Confirmed by Alan KAHN, [facility] ([id number]) on 4/7/2020 9:29:34 PM  
Yes

## 2022-07-19 NOTE — ED STATDOCS - NS ED ATTENDING STATEMENT MOD
This was a shared visit with the VERNON. I reviewed and verified the documentation and independently performed the documented:

## 2022-07-19 NOTE — ED STATDOCS - WR ORDER DATE AND TIME 1
Benign adenoma of prostate    HTN (hypertension)    Hyperlipidemia    Hypertension    Liver abscess    Systolic congestive heart failure  5/2016 EF 45% 19-Jul-2022 15:06

## 2022-07-19 NOTE — ED STATDOCS - ATTENDING APP SHARED VISIT CONTRIBUTION OF CARE
I, Mikael Florez MD,  performed the initial face to face bedside interview with this patient regarding history of present illness, review of symptoms and relevant past medical, social and family history.  I completed an independent physical examination.  I was the initial provider who evaluated this patient.   I personally saw the patient and performed a substantive portion of the visit including all aspects of the medical decision making.  I have signed out the follow up of any pending tests (i.e. labs, radiological studies) to the VERNON.  I have communicated the patient’s plan of care and disposition with the VERNON.  The history, relevant review of systems, past medical and surgical history, medical decision making, and physical examination was documented by the scribe in my presence and I attest to the accuracy of the documentation.

## 2022-07-19 NOTE — ED STATDOCS - PROGRESS NOTE DETAILS
pt aware of all her results and imaging results. pt advised to fu with cardiologist as scheduled in the ED, pt states she has no symptoms currently. pt well appearing on dc and agrees with plan. -Nita Powers PA-C

## 2022-07-19 NOTE — ED ADULT TRIAGE NOTE - CHIEF COMPLAINT QUOTE
Pt comes to the ED complaining of lightheadedness, palpitations and indegestion. Pt was seen at urgent care and sent to the ED for further evaluation. Pt was given baby ASA prior to arrival in ED.

## 2022-07-19 NOTE — ED ADULT TRIAGE NOTE - MEANS OF ARRIVAL
Return immediately to the Emergency Department if you experience continuing or worsening discomfort in your abdomen, fever, chest pain, difficulty breathing, back pain, or any other new or worsening symptoms.       Blunt Abdominal Trauma  Blunt abdominal trauma is a type of injury that involves damage to the abdominal wall or to abdominal organs, such as the liver or spleen. The damage can involve bruising, tearing, or a rupture. This type of injury does not involve a puncture of the skin.  Blunt abdominal trauma can range from mild to severe. In some cases it can lead to a severe abdominal inflammation (peritonitis), severe bleeding, and a dangerous drop in blood pressure.  CAUSES  This injury is caused by a hard, direct hit to the abdomen. It can happen after:  · A motor vehicle accident.  · Being kicked or punched in the abdomen.  · Falling from a significant height.  RISK FACTORS  This injury is more likely to happen in people who:  · Play contact sports.  · Work in a job in which falls or injuries are more likely, such as in construction.  SYMPTOMS  The main symptom of this condition is pain in the abdomen. Other symptoms depend on the type and location of the injury. They can include:  · Abdominal pain that spreads to the the back or shoulder.  · Bruising.  · Swelling.  · Pain when pressing on the abdomen.  · Blood in the urine.  · Weakness.  · Confusion.  · Loss of consciousness.  · Pale, dusky, cool, or sweaty skin.  · Vomiting blood.  · Bloody stool or bleeding from the rectum.  · Trouble breathing.  Symptoms of this injury can develop suddenly or slowly.   DIAGNOSIS  This injury is diagnosed based on your symptoms and a physical exam. You may also have tests, including:  · Blood tests.  · Urine tests.  · Imaging tests, such as:  · A CT scan and ultrasound of your abdomen.  · X-rays of your chest and abdomen.  · A test in which a tube is used to flush your abdomen with fluid and check for blood (diagnostic  peritoneal lavage).  TREATMENT  Treatment for this injury depends on its type and severity. Treatment options include:  · Observation. If the injury is mild, this may be the only treatment needed.  · Support of your blood pressure and breathing.  · Getting blood, fluids, or medicine through an IV tube.  · Antibiotic medicine.  · Insertion of tubes into the stomach or bladder.  · A blood transfusion.  · A procedure to stop bleeding. This involves putting a long, thin tube (catheter) into one of your blood vessels (angiographic embolization).  · Surgery to open up your abdomen and control bleeding or repair damage (laparotomy). This may be done if tests suggest that you have peritonitis or bleeding that cannot be controlled with angiographic embolization.  HOME CARE INSTRUCTIONS  · Take medicines only as directed by your health care provider.  · If you were prescribed an antibiotic medicine, finish all of it even if you start to feel better.  · Follow your health care provider's instructions about diet and activity restrictions.  · Keep all follow-up visits as directed by your health care provider. This is important.  SEEK MEDICAL CARE IF:  · You continue to have abdominal pain.  · Your symptoms return.  · You develop new symptoms.  · You have blood in your urine or your bowel movements.  SEEK IMMEDIATE MEDICAL CARE IF:  · You vomit blood.  · You have heavy bleeding from your rectum.  · You have very bad abdominal pain.  · You have trouble breathing.  · You have chest pain.  · You have a fever.  · You have dizziness.  · You pass out.     This information is not intended to replace advice given to you by your health care provider. Make sure you discuss any questions you have with your health care provider.     Document Released: 01/25/2006 Document Revised: 05/03/2016 Document Reviewed: 12/09/2015  [employer] Interactive Patient Education ©2016 [employer]          Contusion  A contusion is a deep bruise. Contusions  are the result of an injury that caused bleeding under the skin. The contusion may turn blue, purple, or yellow. Minor injuries will give you a painless contusion, but more severe contusions may stay painful and swollen for a few weeks.   CAUSES   A contusion is usually caused by a blow, trauma, or direct force to an area of the body.  SYMPTOMS   · Swelling and redness of the injured area.  · Bruising of the injured area.  · Tenderness and soreness of the injured area.  · Pain.  DIAGNOSIS   The diagnosis can be made by taking a history and physical exam. An X-ray, CT scan, or MRI may be needed to determine if there were any associated injuries, such as fractures.  TREATMENT   Specific treatment will depend on what area of the body was injured. In general, the best treatment for a contusion is resting, icing, elevating, and applying cold compresses to the injured area. Over-the-counter medicines may also be recommended for pain control. Ask your caregiver what the best treatment is for your contusion.  HOME CARE INSTRUCTIONS   · Put ice on the injured area.  ¨ Put ice in a plastic bag.  ¨ Place a towel between your skin and the bag.  ¨ Leave the ice on for 15-20 minutes, 3-4 times a day, or as directed by your health care provider.  · Only take over-the-counter or prescription medicines for pain, discomfort, or fever as directed by your caregiver. Your caregiver may recommend avoiding anti-inflammatory medicines (aspirin, ibuprofen, and naproxen) for 48 hours because these medicines may increase bruising.  · Rest the injured area.  · If possible, elevate the injured area to reduce swelling.  SEEK IMMEDIATE MEDICAL CARE IF:   · You have increased bruising or swelling.  · You have pain that is getting worse.  · Your swelling or pain is not relieved with medicines.  MAKE SURE YOU:   · Understand these instructions.  · Will watch your condition.  · Will get help right away if you are not doing well or get worse.     This  ambulatory information is not intended to replace advice given to you by your health care provider. Make sure you discuss any questions you have with your health care provider.     Document Released: 09/27/2006 Document Revised: 12/23/2014 Document Reviewed: 10/22/2012  [employer] Interactive Patient Education ©2016 [employer]

## 2022-07-19 NOTE — ED STATDOCS - OBJECTIVE STATEMENT
63 y/o female w/ a PMHx of OA presents to the ED from  c/o lightheadedness and HA. Pt also c/o throat discomfort which she describes as indigestion. Pt endorses drinking more than usual x few days ago. Pt also reports feeling tired for the last 2 days. LMP x6 years ago. No other complaints at this time.

## 2022-07-20 LAB
CULTURE RESULTS: SIGNIFICANT CHANGE UP
SPECIMEN SOURCE: SIGNIFICANT CHANGE UP

## 2022-12-12 ENCOUNTER — FORM ENCOUNTER (OUTPATIENT)
Age: 62
End: 2022-12-12

## 2022-12-18 ENCOUNTER — FORM ENCOUNTER (OUTPATIENT)
Age: 62
End: 2022-12-18

## 2022-12-19 ENCOUNTER — APPOINTMENT (OUTPATIENT)
Dept: ORTHOPEDIC SURGERY | Facility: CLINIC | Age: 62
End: 2022-12-19

## 2022-12-19 ENCOUNTER — APPOINTMENT (OUTPATIENT)
Dept: MRI IMAGING | Facility: CLINIC | Age: 62
End: 2022-12-19
Payer: COMMERCIAL

## 2022-12-19 VITALS — HEIGHT: 66 IN | BODY MASS INDEX: 25.71 KG/M2 | WEIGHT: 160 LBS

## 2022-12-19 DIAGNOSIS — M19.90 UNSPECIFIED OSTEOARTHRITIS, UNSPECIFIED SITE: ICD-10-CM

## 2022-12-19 PROCEDURE — 99214 OFFICE O/P EST MOD 30 MIN: CPT

## 2022-12-19 PROCEDURE — 73564 X-RAY EXAM KNEE 4 OR MORE: CPT | Mod: RT

## 2022-12-19 PROCEDURE — 73721 MRI JNT OF LWR EXTRE W/O DYE: CPT | Mod: RT

## 2022-12-20 NOTE — IMAGING
[Right] : right knee [All Views] : anteroposterior, lateral, skyline, and anteroposterior standing [de-identified] : The patient is a well appearing 62 year  old female of their stated age. \par Patient ambulates with a normal gait. \par Negative straight leg raise bilateral \par \par Effected Knee: RIGHT                         	 \par ROM:  0-145 degrees \par Lachman: Negative \par Pivot Shift: Negative \par Anterior Drawer: Negative \par Posterior Drawer / Sag:Negative \par Varus Stress 0 degrees: Stable \par Varus Stress 30 degrees: Stable \par Valgus Stress 0 degrees: Stable \par Valgus Stress 30 degrees: Stable \par Medial Kirti: Negative \par Lateral Kirti: POSITIVE\par Patella Glide: 2+ \par Patella Apprehension: Negative \par Patella Grind: Negative \par \par Palpation: \par Medial Joint Line: Nontender \par Lateral Joint Line: TENDER\par Medial Collateral Ligament: Nontender \par Lateral Collateral Ligament/PLC: Nontender \par Distal Femur: Nontender \par Lateral Tibia: TENDER\par Tibial Tubercle: Nontender \par Distal Pole Patella: Nontender \par Quadriceps Tendon: Nontender &  Intact \par Patella Tendon: Nontender &  Intact \par Medial Femoral Condyle: Nontender \par Medial Distal Hamstring/PES: TENDER\par Lateral Distal Hamstring: Nontender & Stable \par Iliotibial Band: Nontender \par Medial Patellofemoral Ligament: Nontender \par Adductor: Nontender \par Proximal GSC-Plantaris: Nontender \par Calf: Supple & Nontender \par \par Inspection: \par Deformity: No \par Erythema: No \par Ecchymosis: No \par Abrasions: No \par Effusion: No \par Prepatella Bursitis: No \par Neurologic Exam: \par Sensation L4-S1: Grossly Intact \par Motor Exam: \par Quadriceps: 5 out of 5 \par Hamstrings: 5 out of 5 \par EHL: 5 out of 5 \par FHL: 5 out of 5 \par TA: 5 out of 5 \par GS: 5 out of 5 \par Circulatory/Pulses: \par Dorsalis Pedis: 2+ \par Posterior Tibialis: 2+ \par Additional Pertinent Findings: None \par \par Contralateral Knee:                           	 \par ROM: 0-145 degrees \par Other Pertinent Findings: None \par \par Assessment: The patient is a 62 year  old female  with right knee pain and radiographic and physical exam findings consistent with possible lateral tibial stress reaction versus fracture. \par The patient’s condition is acute \par Documents/Results Reviewed Today: X ray right knee\par Tests/Studies Independently Interpreted Today: X ray right knee reveals PF OA and mild lateral joint compartment narrowing\par Pertinent findings include:  LJLT, + lateral kirti, lateral tibia tenderness, distal medial hamstring tenderness \par Confounding medical conditions/concerns: partial lateral meniscectomy 2020\par \par Plan: Patient will obtain MRI right knee to evaluate for lateral tibial stress reaction versus fracture. Modify activity as discussed.  \par Tests Ordered: MRI right knee\par Prescription Medications Ordered: None\par Braces/DME Ordered: None \par Activity/Work/Sports Status: None \par Additional Instructions: None\par Follow-Up: after MRI \par \par The patient's current medication management of their orthopedic diagnosis was reviewed today:\par (1) We discussed a comprehensive treatment plan that included possible pharmaceutical management involving the use of prescription strength medications including but not limited to options such as oral Naprosyn 500mg BID, once daily Meloxicam 15 mg, or 500-650 mg Tylenol versus over the counter oral medications and topical prescription NSAID Pennsaid vs over the counter Voltaren gel.\par (2) There is a moderate risk of morbidity with further treatment, especially from use of prescription strength medications and possible side effects of these medications which include upset stomach with oral medications, skin reactions to topical medications and cardiac/renal issues with long term use.\par (3) I recommended that the patient follow-up with their medical physician to discuss any significant specific potential issues with long term medication use such as interactions with current medications or with exacerbation of underlying medical comorbidities.\par (4) The benefits and risks associated with use of injectable, oral or topical, prescription and over the counter anti-inflammatory medications were discussed with the patient. The patient voiced understanding of the risks including but not limited to bleeding, stroke, kidney dysfunction, heart disease, and were referred to the black box warning label for further information.\par \par \par I, Winnie Nation, attest that this documentation has been prepared under the direction and in the presence of Provider Dr. Cecil Morrison.\par \par The documentation recorded by the scribe accurately reflects the service I personally performed and the decisions made by me.\par The patient was seen by me under the direct supervision of Dr. Cecil Morrison. [FreeTextEntry9] : PF OA and mild lateral joint compartment narrowing

## 2022-12-20 NOTE — HISTORY OF PRESENT ILLNESS
[de-identified] : The patient is a 62 year  old right hand dominant female who presents today complaining of right knee pain.  \par Date of Injury/Onset: 10/1/22\par Pain:    At Rest: 5/10 \par With Activity:  7/10 \par Mechanism of injury: hx of knee pain, pain has gotten progressively worse the last 2 months with no RYNE\par This is NOT a Work Related Injury being treated under Worker's Compensation.\par This is NOT an athletic injury occurring associated with an interscholastic or organized sports team.\par Quality of symptoms: sharp, stabbing, throbbing, medial and lateral joint line pain\par Improves with: ice, elevation\par Worse with: increased activity, prolonged walking/standing\par Prior treatment:  in feb 2020\par Prior Imaging: no recent imaging\par Out of work/sport: no, since n/e\par School/Sport/Position/Occupation: business owner\par Additional Information: None\par

## 2022-12-23 ENCOUNTER — APPOINTMENT (OUTPATIENT)
Dept: ORTHOPEDIC SURGERY | Facility: CLINIC | Age: 62
End: 2022-12-23

## 2022-12-23 VITALS — HEIGHT: 66 IN | WEIGHT: 160 LBS | BODY MASS INDEX: 25.71 KG/M2

## 2022-12-23 PROCEDURE — 99214 OFFICE O/P EST MOD 30 MIN: CPT | Mod: 25

## 2022-12-23 PROCEDURE — J3490M: CUSTOM

## 2022-12-23 PROCEDURE — 20610 DRAIN/INJ JOINT/BURSA W/O US: CPT | Mod: RT

## 2022-12-23 NOTE — DATA REVIEWED
[MRI] : MRI [Right] : of the right [Knee] : knee [Report was reviewed and noted in the chart] : The report was reviewed and noted in the chart [I independently reviewed and interpreted images and report] : I independently reviewed and interpreted images and report [I reviewed the films/CD and additionally noted] : I reviewed the films/CD and additionally noted [FreeTextEntry1] : evidence prior partial medial meniscectomy, lateral and patellofemoral degenerative changes, small bakers cyst

## 2022-12-23 NOTE — HISTORY OF PRESENT ILLNESS
[de-identified] : The patient is a 62 year  old right hand dominant female who presents today complaining of right knee pain.  \par Date of Injury/Onset: 10/1/22\par Pain:    At Rest: 5/10 \par With Activity:  7/10 \par Mechanism of injury: hx of knee pain, pain has gotten progressively worse the last 2 months with no RYNE\par This is NOT a Work Related Injury being treated under Worker's Compensation.\par This is NOT an athletic injury occurring associated with an interscholastic or organized sports team.\par Quality of symptoms: sharp, stabbing, throbbing, medial and lateral joint line pain\par Improves with: ice, elevation\par Worse with: increased activity, prolonged walking/standing\par Treatment/Imaging/Studies Since Last Visit: MRI\par 	Reports Available For Review Today: MRI report\par Out of work/sport: no, since n/e\par School/Sport/Position/Occupation: business owner\par changes since last visit: patient is taking Naprosyn and has had relief. Here to review MRI results\par Additional Information: None\par

## 2022-12-23 NOTE — IMAGING
[de-identified] : The patient is a well appearing 62 year  old female of their stated age. \par Patient ambulates with a normal gait. \par Negative straight leg raise bilateral \par \par Effected Knee: RIGHT                         	 \par ROM:  0-145 degrees \par Lachman: Negative \par Pivot Shift: Negative \par Anterior Drawer: Negative \par Posterior Drawer / Sag:Negative \par Varus Stress 0 degrees: Stable \par Varus Stress 30 degrees: Stable \par Valgus Stress 0 degrees: Stable \par Valgus Stress 30 degrees: Stable \par Medial Kirti: Negative \par Lateral Kirti: POSITIVE\par Patella Glide: 2+ \par Patella Apprehension: Negative \par Patella Grind: Negative \par \par Palpation: \par Medial Joint Line: Nontender \par Lateral Joint Line: TENDER\par Medial Collateral Ligament: Nontender \par Lateral Collateral Ligament/PLC: Nontender \par Distal Femur: Nontender \par Lateral Tibia: TENDER\par Tibial Tubercle: Nontender \par Distal Pole Patella: Nontender \par Quadriceps Tendon: Nontender &  Intact \par Patella Tendon: Nontender &  Intact \par Medial Femoral Condyle: Nontender \par Medial Distal Hamstring/PES: TENDER\par Lateral Distal Hamstring: Nontender & Stable \par Iliotibial Band: Nontender \par Medial Patellofemoral Ligament: Nontender \par Adductor: Nontender \par Proximal GSC-Plantaris: Nontender \par Calf: Supple & Nontender \par \par Inspection: \par Deformity: No \par Erythema: No \par Ecchymosis: No \par Abrasions: No \par Effusion: No \par Prepatella Bursitis: No \par Neurologic Exam: \par Sensation L4-S1: Grossly Intact \par Motor Exam: \par Quadriceps: 5 out of 5 \par Hamstrings: 5 out of 5 \par EHL: 5 out of 5 \par FHL: 5 out of 5 \par TA: 5 out of 5 \par GS: 5 out of 5 \par Circulatory/Pulses: \par Dorsalis Pedis: 2+ \par Posterior Tibialis: 2+ \par Additional Pertinent Findings: None \par \par Contralateral Knee:                           	 \par ROM: 0-145 degrees \par Other Pertinent Findings: None \par \par Assessment: The patient is a 62 year  old female  with right knee pain and radiographic and physical exam findings consistent with lateral OA and PF OA\par The patient’s condition is acute \par Documents/Results Reviewed Today: MRI right knee\par Tests/Studies Independently Interpreted Today: MRI right knee reveals evidence prior partial medial meniscectomy, lateral and patellofemoral degenerative changes, small bakers cyst\par Pertinent findings include:  LJLT, + lateral kirti, lateral tibia tenderness, distal medial hamstring tenderness \par Confounding medical conditions/concerns: partial lateral meniscectomy 2020\par \par Plan: Discussed conservative treatments in the form of injections. Patient elected to receive right knee 9/1 CSI. Patient will start physical therapy, HEP, and stretching. Advised patient to obtain Reparel sleeve. Continue taking Naprosyn as needed - use as directed. Modify activity as discussed. \par Tests Ordered: None \par Prescription Medications Ordered: None\par Braces/DME Ordered: None \par Activity/Work/Sports Status: None \par Additional Instructions: None\par Follow-Up: 6 weeks\par \par Procedure Note: Musculoskeletal Injection \par Diagnosis:  right knee lateral OA and PF OA\par Procedure: right knee, superolateral, 9/1 CSI\par \par Indication:  The patient has had persistent pain despite conservative treatment.  Risks, benefits and alternatives to procedure were discussed; all questions were answered to the patient's apparent satisfaction and informed consent obtained.  The patient denied prior problems with local anesthetics, injectable cortisones, chicken allergy, coagulopathy and no relevant drug or preservative allergies or sensitivities.\par \par The area of injection was prepared in a sterile fashion.  Prior to injection a 'Time Out' was conducted in accordance with [facility] policy and the site and nature of procedure verified with the patient. \par \par Procedure: \par The procedure was carried out utilizing sterile technique from a superolateral arthroscopic portal position. \par \par 0cc of clear synovial fluid was aspirated. \par The specimen: \par (X) appeared benign and was discarded \par ( ) was sent for Culture / Cell Count / Crystal analysis / [_].] \par \par Injection into the target area with care taken to aspirate frequently to minimize the risk of intravascular injection was performed with: \par ( ) 1cc of Depomedrol (80mg/ml) \par (X) 1cc of Dexamethasone (10mg/ml) \par ( ) 1cc of Toradol (30mg/ml) \par (X) 9cc of 0.5% Bupivacaine \par ( ) 1cc of 1% Lidocaine \par ( ) 5cc of 32mg Zilretta, prepared and diluted per  instructions \par ( ) 2 cc of Hylan G-F 20 (Synvisc) 16mg/2ml \par ( ) 6 cc of Hylan G-F 20 (SynvisoOne) 16mg/2ml \par ( ) 2cc of Euflexxa \par ( ) 2cc of Orthovisc \par ( ) 2cc of GelOne \par ( ) 3cc of Durolane (20mg/ml) \par \par Patient tolerated the procedure well and direct pressure was applied for hemostasis. The patient was reminded of potential post-injection risks including, but not limited to, delayed hypersensitivity reactions and/or infection.  The patient verified that they had the office and the Emergency Room's contact information if any problems should arise.  After several minutes, the patient informed me that they felt fine and was released from the office. \par \par The patient's current medication management of their orthopedic diagnosis was reviewed today:\par (1) We discussed a comprehensive treatment plan that included possible pharmaceutical management involving the use of prescription strength medications including but not limited to options such as oral Naprosyn 500mg BID, once daily Meloxicam 15 mg, or 500-650 mg Tylenol versus over the counter oral medications and topical prescription NSAID Pennsaid vs over the counter Voltaren gel.\par (2) There is a moderate risk of morbidity with further treatment, especially from use of prescription strength medications and possible side effects of these medications which include upset stomach with oral medications, skin reactions to topical medications and cardiac/renal issues with long term use.\par (3) I recommended that the patient follow-up with their medical physician to discuss any significant specific potential issues with long term medication use such as interactions with current medications or with exacerbation of underlying medical comorbidities.\par (4) The benefits and risks associated with use of injectable, oral or topical, prescription and over the counter anti-inflammatory medications were discussed with the patient. The patient voiced understanding of the risks including but not limited to bleeding, stroke, kidney dysfunction, heart disease, and were referred to the black box warning label for further information.\par \par \par I, Winnie Nation, attest that this documentation has been prepared under the direction and in the presence of Provider Dr. Cecil Morrison.\par \par \par The documentation recorded by the scribe accurately reflects the service I personally performed and the decisions made by me.\par The patient was seen by me under the direct supervision of Dr. Cecil Morrison\par

## 2023-01-12 ENCOUNTER — APPOINTMENT (OUTPATIENT)
Dept: ORTHOPEDIC SURGERY | Facility: CLINIC | Age: 63
End: 2023-01-12
Payer: COMMERCIAL

## 2023-01-12 VITALS — BODY MASS INDEX: 25.71 KG/M2 | WEIGHT: 160 LBS | HEIGHT: 66 IN

## 2023-01-12 PROCEDURE — 99214 OFFICE O/P EST MOD 30 MIN: CPT | Mod: 25

## 2023-01-12 PROCEDURE — L1833: CPT | Mod: RT

## 2023-01-12 PROCEDURE — L2397: CPT | Mod: RT

## 2023-01-12 PROCEDURE — 73564 X-RAY EXAM KNEE 4 OR MORE: CPT | Mod: RT

## 2023-01-13 NOTE — HISTORY OF PRESENT ILLNESS
[de-identified] : The patient is a 62 year  old right hand dominant female who presents today complaining of right knee pain.  \par Date of Injury/Onset: 10/1/22; Re-injured: 1/6/23\par Pain:    At Rest: 5/10 \par With Activity:  9/10 \par Mechanism of injury: hx of knee pain, pain has gotten progressively worse the last 2 months with no RYNE\par This is NOT a Work Related Injury being treated under Worker's Compensation.\par This is NOT an athletic injury occurring associated with an interscholastic or organized sports team.\par Quality of symptoms: sharp, stabbing pain, clicking/snapping sensation, weakness, instability\par Improves with: rest, NSAID's\par Worse with: increased activity, prolonged walking/standing, stairs\par Treatment/Imaging/Studies Since Last Visit: none\par 	Reports Available For Review Today: none\par Out of work/sport: currently working\par School/Sport/Position/Occupation: business owner\par changes since last visit: Pt had a setback with her right knee when on vacation. She stepped [place] of about [place] 1 [place] and twisted her right knee, reinjuring it. \par Additional Information: None\par

## 2023-01-13 NOTE — IMAGING
[Right] : right knee [All Views] : anteroposterior, lateral, skyline, and anteroposterior standing [de-identified] : The patient is a well appearing 62 year  old female of their stated age. \par Patient ambulates with a normal gait. \par Negative straight leg raise bilateral \par \par Effected Knee: RIGHT                         	 \par ROM:  0-145 degrees \par Lachman: Negative \par Pivot Shift: Negative \par Anterior Drawer: Negative \par Posterior Drawer / Sag:Negative \par Varus Stress 0 degrees: Stable \par Varus Stress 30 degrees: Stable \par Valgus Stress 0 degrees: Stable \par Valgus Stress 30 degrees: Stable \par Medial Kirti: Negative \par Lateral Kirti: POSITIVE\par Patella Glide: 2+ \par Patella Apprehension: Negative \par Patella Grind: Negative \par \par Palpation: \par Medial Joint Line: Nontender \par Lateral Joint Line: TENDER\par Medial Collateral Ligament: Nontender \par Lateral Collateral Ligament/PLC: TENDER\par Distal Femur: Nontender \par Lateral Tibia: Nontender \par Tibial Tubercle: Nontender \par Distal Pole Patella: Nontender \par Quadriceps Tendon: Nontender &  Intact \par Patella Tendon: Nontender &  Intact \par Medial Femoral Condyle: Nontender \par Medial Distal Hamstring/PES: Nontender\par Lateral Distal Hamstring: Nontender & Stable \par Iliotibial Band: Nontender \par Medial Patellofemoral Ligament: Nontender \par Adductor: Nontender \par Proximal GSC-Plantaris: Nontender \par Calf: Supple & Nontender \par \par Inspection: \par Deformity: No \par Erythema: No \par Ecchymosis: No \par Abrasions: No \par Effusion: No \par Prepatella Bursitis: No \par Neurologic Exam: \par Sensation L4-S1: Grossly Intact \par Motor Exam: \par Quadriceps: 5 out of 5 \par Hamstrings: 5 out of 5 \par EHL: 5 out of 5 \par FHL: 5 out of 5 \par TA: 5 out of 5 \par GS: 5 out of 5 \par Circulatory/Pulses: \par Dorsalis Pedis: 2+ \par Posterior Tibialis: 2+ \par Additional Pertinent Findings: None \par \par Contralateral Knee:                           	 \par ROM: 0-145 degrees \par Other Pertinent Findings: None \par \par Assessment: The patient is a 62 year  old female  with right knee pain and radiographic and physical exam findings consistent with  possible LCL tear versus LMT s/p falling injury.\par The patient’s condition is acute \par Documents/Results Reviewed Today: X-Ray right knee\par Tests/Studies Independently Interpreted Today: X-Ray right knee reveals PF OA and lateral joint compartment narrowing.\par Pertinent findings include:  LJLT, + lateral kirti, lateral collateral ligament tenderness\par Confounding medical conditions/concerns: partial lateral meniscectomy 2020\par \par Plan: Due to worsening pain and instability with mechanical symptoms, patient will obtain new MRI right knee after traumatic injury event to evaluate for LCL tear versus lateral meniscus tear. Modify activity as discussed. \par Tests Ordered: MRI right knee \par Prescription Medications Ordered: None\par Braces/DME Ordered: None \par Activity/Work/Sports Status: None \par Additional Instructions: None\par Follow-Up: after MRI\par \par The patient's current medication management of their orthopedic diagnosis was reviewed today:\par (1) We discussed a comprehensive treatment plan that included possible pharmaceutical management involving the use of prescription strength medications including but not limited to options such as oral Naprosyn 500mg BID, once daily Meloxicam 15 mg, or 500-650 mg Tylenol versus over the counter oral medications and topical prescription NSAID Pennsaid vs over the counter Voltaren gel.\par (2) There is a moderate risk of morbidity with further treatment, especially from use of prescription strength medications and possible side effects of these medications which include upset stomach with oral medications, skin reactions to topical medications and cardiac/renal issues with long term use.\par (3) I recommended that the patient follow-up with their medical physician to discuss any significant specific potential issues with long term medication use such as interactions with current medications or with exacerbation of underlying medical comorbidities.\par (4) The benefits and risks associated with use of injectable, oral or topical, prescription and over the counter anti-inflammatory medications were discussed with the patient. The patient voiced understanding of the risks including but not limited to bleeding, stroke, kidney dysfunction, heart disease, and were referred to the black box warning label for further information.\par \par I, Winnie Nation, attest that this documentation has been prepared under the direction and in the presence of Provider Dr. Cecil Morrison.\par \par \par The documentation recorded by the scribe accurately reflects the service I personally performed and the decisions made by me.\par  [FreeTextEntry9] : PF OA and lateral joint compartment narrowing.

## 2023-01-18 ENCOUNTER — FORM ENCOUNTER (OUTPATIENT)
Age: 63
End: 2023-01-18

## 2023-01-19 ENCOUNTER — APPOINTMENT (OUTPATIENT)
Dept: MRI IMAGING | Facility: CLINIC | Age: 63
End: 2023-01-19
Payer: COMMERCIAL

## 2023-01-19 ENCOUNTER — APPOINTMENT (OUTPATIENT)
Dept: ORTHOPEDIC SURGERY | Facility: CLINIC | Age: 63
End: 2023-01-19
Payer: COMMERCIAL

## 2023-01-19 VITALS — BODY MASS INDEX: 25.71 KG/M2 | HEIGHT: 66 IN | WEIGHT: 160 LBS

## 2023-01-19 PROCEDURE — 73721 MRI JNT OF LWR EXTRE W/O DYE: CPT | Mod: RT

## 2023-01-19 PROCEDURE — 99214 OFFICE O/P EST MOD 30 MIN: CPT

## 2023-01-22 NOTE — DATA REVIEWED
[MRI] : MRI [Right] : of the right [Knee] : knee [Report was reviewed and noted in the chart] : The report was reviewed and noted in the chart [I independently reviewed and interpreted images and report] : I independently reviewed and interpreted images and report [I reviewed the films/CD and additionally noted] : I reviewed the films/CD and additionally noted [FreeTextEntry1] :  PF OA and evidence of prior lateral meniscectomy

## 2023-01-22 NOTE — HISTORY OF PRESENT ILLNESS
[de-identified] : The patient is a 62 year  old right hand dominant female who presents today complaining of right knee pain.  \par Date of Injury/Onset: 10/1/22; Re-injured: 1/6/23\par Pain:    At Rest: 5/10 \par With Activity:  8/10 \par Mechanism of injury: hx of knee pain, pain has gotten progressively worse the last 2 months with no RYNE\par This is NOT a Work Related Injury being treated under Worker's Compensation.\par This is NOT an athletic injury occurring associated with an interscholastic or organized sports team.\par Quality of symptoms: sharp, stabbing pain, clicking/snapping sensation, weakness, instability\par Improves with: rest, NSAID's\par Worse with: increased activity, prolonged walking/standing, stairs\par Treatment/Imaging/Studies Since Last Visit: MRI\par 	Reports Available For Review Today: MRI report\par Out of work/sport: currently working\par School/Sport/Position/Occupation: business owner\par changes since last visit: Pt had a setback with her right knee when on vacation. She stepped off of about a 1 foot [place] off on the beach and twisted her right knee, reinjuring it. \par Additional Information: None\par

## 2023-01-22 NOTE — IMAGING
[de-identified] : The patient is a well appearing 62 year  old female of their stated age. \par Patient ambulates with a normal gait. \par Negative straight leg raise bilateral \par \par Effected Knee: RIGHT                         	 \par ROM:  0-145 degrees \par Lachman: Negative \par Pivot Shift: Negative \par Anterior Drawer: Negative \par Posterior Drawer / Sag:Negative \par Varus Stress 0 degrees: Stable \par Varus Stress 30 degrees: Stable \par Valgus Stress 0 degrees: Stable \par Valgus Stress 30 degrees: Stable \par Medial Kirti: Negative \par Lateral Kirti: POSITIVE\par Patella Glide: 2+ \par Patella Apprehension: Negative \par Patella Grind: Negative \par \par Palpation: \par Medial Joint Line: Nontender \par Lateral Joint Line: TENDER\par Medial Collateral Ligament: Nontender \par Lateral Collateral Ligament/PLC: TENDER\par Distal Femur: Nontender \par Lateral Tibia: Nontender \par Tibial Tubercle: Nontender \par Distal Pole Patella: Nontender \par Quadriceps Tendon: Nontender &  Intact \par Patella Tendon: Nontender &  Intact \par Medial Femoral Condyle: Nontender \par Medial Distal Hamstring/PES: Nontender\par Lateral Distal Hamstring: Nontender & Stable \par Iliotibial Band: Nontender \par Medial Patellofemoral Ligament: Nontender \par Adductor: Nontender \par Proximal GSC-Plantaris: Nontender \par Calf: Supple & Nontender \par \par Inspection: \par Deformity: No \par Erythema: No \par Ecchymosis: No \par Abrasions: No \par Effusion: No \par Prepatella Bursitis: No \par Neurologic Exam: \par Sensation L4-S1: Grossly Intact \par Motor Exam: \par Quadriceps: 5 out of 5 \par Hamstrings: 5 out of 5 \par EHL: 5 out of 5 \par FHL: 5 out of 5 \par TA: 5 out of 5 \par GS: 5 out of 5 \par Circulatory/Pulses: \par Dorsalis Pedis: 2+ \par Posterior Tibialis: 2+ \par Additional Pertinent Findings: None \par \par Contralateral Knee:                           	 \par ROM: 0-145 degrees \par Other Pertinent Findings: None \par \par Assessment: The patient is a 62 year  old female  with right knee pain and radiographic and physical exam findings consistent with  PF OA and hyperextension injury s/p fall.\par The patient’s condition is acute \par Documents/Results Reviewed Today: MRI right knee\par Tests/Studies Independently Interpreted Today: MRI right knee reveals PF OA and evidence of prior lateral meniscectomy \par Pertinent findings include:  LJLT, + lateral kirti, lateral collateral ligament tenderness\par Confounding medical conditions/concerns: partial lateral meniscectomy 2020\par \par Plan: Patient has had temporary relief in the past from a corticosteroid injection. Will await authorization for Gel One injection for the right knee. Patient will start physical therapy, HEP, and stretching. Prescribed Naprosyn for pain management - use as directed. Modify activity as discussed. \par Tests Ordered: None \par Prescription Medications Ordered: Naprosyn\par Braces/DME Ordered: None \par Activity/Work/Sports Status: None \par Additional Instructions: None\par Follow-Up: for Gel One injection\par \par The patient's current medication management of their orthopedic diagnosis was reviewed today:\par (1) We discussed a comprehensive treatment plan that included possible pharmaceutical management involving the use of prescription strength medications including but not limited to options such as oral Naprosyn 500mg BID, once daily Meloxicam 15 mg, or 500-650 mg Tylenol versus over the counter oral medications and topical prescription NSAID Pennsaid vs over the counter Voltaren gel.\par (2) There is a moderate risk of morbidity with further treatment, especially from use of prescription strength medications and possible side effects of these medications which include upset stomach with oral medications, skin reactions to topical medications and cardiac/renal issues with long term use.\par (3) I recommended that the patient follow-up with their medical physician to discuss any significant specific potential issues with long term medication use such as interactions with current medications or with exacerbation of underlying medical comorbidities.\par (4) The benefits and risks associated with use of injectable, oral or topical, prescription and over the counter anti-inflammatory medications were discussed with the patient. The patient voiced understanding of the risks including but not limited to bleeding, stroke, kidney dysfunction, heart disease, and were referred to the black box warning label for further information.\par \par I, Winnie Nation, attest that this documentation has been prepared under the direction and in the presence of Provider Dr. Cecil Morrison.\par \par The documentation recorded by the scribe accurately reflects the service I personally performed and the decisions made by me.\par

## 2023-01-23 ENCOUNTER — APPOINTMENT (OUTPATIENT)
Dept: FAMILY MEDICINE | Facility: CLINIC | Age: 63
End: 2023-01-23

## 2023-01-30 ENCOUNTER — APPOINTMENT (OUTPATIENT)
Dept: ORTHOPEDIC SURGERY | Facility: CLINIC | Age: 63
End: 2023-01-30
Payer: COMMERCIAL

## 2023-01-30 VITALS — HEIGHT: 66 IN | WEIGHT: 160 LBS | BODY MASS INDEX: 25.71 KG/M2

## 2023-01-30 DIAGNOSIS — M17.11 UNILATERAL PRIMARY OSTEOARTHRITIS, RIGHT KNEE: ICD-10-CM

## 2023-01-30 DIAGNOSIS — M25.561 PAIN IN RIGHT KNEE: ICD-10-CM

## 2023-01-30 DIAGNOSIS — S89.90XA UNSPECIFIED INJURY OF UNSPECIFIED LOWER LEG, INITIAL ENCOUNTER: ICD-10-CM

## 2023-01-30 PROCEDURE — 20611 DRAIN/INJ JOINT/BURSA W/US: CPT | Mod: RT

## 2023-01-30 PROCEDURE — 99213 OFFICE O/P EST LOW 20 MIN: CPT | Mod: 25

## 2023-01-31 NOTE — IMAGING
[de-identified] : The patient is a well appearing 62 year  old female of their stated age. \par Patient ambulates with a normal gait. \par Negative straight leg raise bilateral \par \par Effected Knee: RIGHT                         	 \par ROM:  0-145 degrees \par Lachman: Negative \par Pivot Shift: Negative \par Anterior Drawer: Negative \par Posterior Drawer / Sag:Negative \par Varus Stress 0 degrees: Stable \par Varus Stress 30 degrees: Stable \par Valgus Stress 0 degrees: Stable \par Valgus Stress 30 degrees: Stable \par Medial Kirti: Negative \par Lateral Kirti: POSITIVE\par Patella Glide: 2+ \par Patella Apprehension: Negative \par Patella Grind: Negative \par \par Palpation: \par Medial Joint Line: Nontender \par Lateral Joint Line: TENDER\par Medial Collateral Ligament: Nontender \par Lateral Collateral Ligament/PLC: TENDER\par Distal Femur: Nontender \par Lateral Tibia: Nontender \par Tibial Tubercle: Nontender \par Distal Pole Patella: Nontender \par Quadriceps Tendon: Nontender &  Intact \par Patella Tendon: Nontender &  Intact \par Medial Femoral Condyle: Nontender \par Medial Distal Hamstring/PES: Nontender\par Lateral Distal Hamstring: Nontender & Stable \par Iliotibial Band: Nontender \par Medial Patellofemoral Ligament: Nontender \par Adductor: Nontender \par Proximal GSC-Plantaris: Nontender \par Calf: Supple & Nontender \par \par Inspection: \par Deformity: No \par Erythema: No \par Ecchymosis: No \par Abrasions: No \par Effusion: No \par Prepatella Bursitis: No \par Neurologic Exam: \par Sensation L4-S1: Grossly Intact \par Motor Exam: \par Quadriceps: 5 out of 5 \par Hamstrings: 5 out of 5 \par EHL: 5 out of 5 \par FHL: 5 out of 5 \par TA: 5 out of 5 \par GS: 5 out of 5 \par Circulatory/Pulses: \par Dorsalis Pedis: 2+ \par Posterior Tibialis: 2+ \par Additional Pertinent Findings: None \par \par Contralateral Knee:                           	 \par ROM: 0-145 degrees \par Other Pertinent Findings: None \par \par Assessment: The patient is a 62 year  old female  with right knee pain and radiographic and physical exam findings consistent with  PF OA and hyperextension injury s/p fall.\par The patient’s condition is acute \par Documents/Results Reviewed Today: None \par Tests/Studies Independently Interpreted Today: None \par Pertinent findings include:  LJLT, + lateral kirti, lateral collateral ligament tenderness\par Confounding medical conditions/concerns: partial lateral meniscectomy 2020\par \par Plan:Patient presents for conservative treatment in the form of injection therapy today. Patient received right knee Durolane injection. Continue physical therapy, HEP, and stretching. Continue use of Naprosyn for pain management - use as directed and take with food. Modify activity as discussed. \par Tests Ordered: None \par Prescription Medications Ordered: Previously prescribed Naprosyn \par Braces/DME Ordered: None \par Activity/Work/Sports Status: None \par Additional Instructions: None\par Follow-Up: 6 months \par \par Procedure Note: Musculoskeletal Injection \par Diagnosis: right knee PF OA\par Procedure: right knee, superolateral, Durolane injection \par \par Indication:  The patient has had persistent pain despite conservative treatment.  Risks, benefits and alternatives to procedure were discussed; all questions were answered to the patient's apparent satisfaction and informed consent obtained.  The patient denied prior problems with local anesthetics, injectable cortisones, chicken allergy, coagulopathy and no relevant drug or preservative allergies or sensitivities.\par \par The area of injection was prepared in a sterile fashion.  Prior to injection a 'Time Out' was conducted in accordance with [facility] policy and the site and nature of procedure verified with the patient. \par \par Procedure: \par The procedure was carried out utilizing sterile technique from a superolateral arthroscopic portal position. The needle was placed under ultrasound guidance to improve accuracy and minimize risk to the patient and diagnostic ultrasound in the long and short axis revealed patellofemoral joint compartment narrowing. \par \par 0cc of clear synovial fluid was aspirated. \par The specimen: \par (X) appeared benign and was discarded \par ( ) was sent for Culture / Cell Count / Crystal analysis / [_].] \par \par Injection into the target area with care taken to aspirate frequently to minimize the risk of intravascular injection was performed with: \par ( ) 1cc of Depomedrol (80mg/ml) \par ( ) 1cc of Dexamethasone (10mg/ml) \par ( ) 1cc of Toradol (30mg/ml) \par ( ) 9cc of 0.5% Bupivacaine \par (X) 5 cc of 1% Lidocaine \par ( ) 5cc of 32mg Zilretta, prepared and diluted per  instructions \par ( ) 2 cc of Hylan G-F 20 (Synvisc) 16mg/2ml \par ( ) 6 cc of Hylan G-F 20 (SynvisoOne) 16mg/2ml \par ( ) 2cc of Euflexxa \par ( ) 2cc of Orthovisc \par ( ) 2cc of GelOne \par (X) 3cc of Durolane (20mg/ml) \par \par Patient tolerated the procedure well and direct pressure was applied for hemostasis. The patient was reminded of potential post-injection risks including, but not limited to, delayed hypersensitivity reactions and/or infection.  The patient verified that they had the office and the Emergency Room's contact information if any problems should arise.  After several minutes, the patient informed me that they felt fine and was released from the office.\par \par The patient's current medication management of their orthopedic diagnosis was reviewed today:\par (1) We discussed a comprehensive treatment plan that included possible pharmaceutical management involving the use of prescription strength medications including but not limited to options such as oral Naprosyn 500mg BID, once daily Meloxicam 15 mg, or 500-650 mg Tylenol versus over the counter oral medications and topical prescription NSAID Pennsaid vs over the counter Voltaren gel.\par (2) There is a moderate risk of morbidity with further treatment, especially from use of prescription strength medications and possible side effects of these medications which include upset stomach with oral medications, skin reactions to topical medications and cardiac/renal issues with long term use.\par (3) I recommended that the patient follow-up with their medical physician to discuss any significant specific potential issues with long term medication use such as interactions with current medications or with exacerbation of underlying medical comorbidities.\par (4) The benefits and risks associated with use of injectable, oral or topical, prescription and over the counter anti-inflammatory medications were discussed with the patient. The patient voiced understanding of the risks including but not limited to bleeding, stroke, kidney dysfunction, heart disease, and were referred to the black box warning label for further information.\par \par I, Winine Nation, attest that this documentation has been prepared under the direction and in the presence of Provider Dr. Cecil Morrison.\par \par \par The documentation recorded by the scribe accurately reflects the service I Kumar Zarco PA-C personally performed and the decisions made by me.\par The patient was seen by me under the direct supervision of Dr. Cecil Morrison\par

## 2023-01-31 NOTE — HISTORY OF PRESENT ILLNESS
[de-identified] : The patient is a 62 year  old right hand dominant female who presents today complaining of right knee pain and possible inj. therapy. \par Date of Injury/Onset: 10/1/22; Re-injured: 1/6/23\par Pain:    At Rest: 2/10 \par With Activity:  6/10 \par Mechanism of injury: hx of knee pain, pain has gotten progressively worse the last 2 months with no RYNE\par This is NOT a Work Related Injury being treated under Worker's Compensation.\par This is NOT an athletic injury occurring associated with an interscholastic or organized sports team.\par Quality of symptoms: sharp, stabbing pain, clicking/snapping sensation, weakness, instability\par Improves with: rest, NSAID's, PT\par Worse with: increased activity, prolonged walking/standing, stairs\par Treatment/Imaging/Studies Since Last Visit: PT\par 	Reports Available For Review Today: MRI report\par Out of work/sport: currently working\par School/Sport/Position/Occupation: business owner\par changes since last visit: Pt had a setback with her right knee when on vacation. She stepped off of about a 1 foot [place] on the [place] and twisted her right knee, reinjuring it. \par Additional Information: None\par

## 2023-02-06 ENCOUNTER — APPOINTMENT (OUTPATIENT)
Dept: ORTHOPEDIC SURGERY | Facility: CLINIC | Age: 63
End: 2023-02-06

## 2023-07-20 ENCOUNTER — APPOINTMENT (OUTPATIENT)
Dept: FAMILY MEDICINE | Facility: CLINIC | Age: 63
End: 2023-07-20
Payer: COMMERCIAL

## 2023-07-20 VITALS
BODY MASS INDEX: 27.32 KG/M2 | HEART RATE: 72 BPM | SYSTOLIC BLOOD PRESSURE: 120 MMHG | HEIGHT: 66 IN | OXYGEN SATURATION: 98 % | WEIGHT: 170 LBS | TEMPERATURE: 97.6 F | DIASTOLIC BLOOD PRESSURE: 80 MMHG

## 2023-07-20 DIAGNOSIS — L98.9 DISORDER OF THE SKIN AND SUBCUTANEOUS TISSUE, UNSPECIFIED: ICD-10-CM

## 2023-07-20 PROCEDURE — 99213 OFFICE O/P EST LOW 20 MIN: CPT

## 2023-07-20 RX ORDER — OLOPATADINE HYDROCHLORIDE 665 UG/1
0.6 SPRAY, METERED NASAL TWICE DAILY
Qty: 1 | Refills: 3 | Status: DISCONTINUED | COMMUNITY
Start: 2021-04-30 | End: 2023-07-20

## 2023-07-20 RX ORDER — NAPROXEN 500 MG/1
500 TABLET ORAL
Qty: 60 | Refills: 0 | Status: DISCONTINUED | COMMUNITY
Start: 2023-01-19 | End: 2023-07-20

## 2023-07-20 RX ORDER — NAPROXEN 500 MG/1
500 TABLET ORAL
Qty: 60 | Refills: 0 | Status: DISCONTINUED | COMMUNITY
Start: 2022-12-19 | End: 2023-07-20

## 2023-07-20 RX ORDER — ALBUTEROL SULFATE 90 UG/1
108 (90 BASE) INHALANT RESPIRATORY (INHALATION)
Qty: 1 | Refills: 6 | Status: ACTIVE | COMMUNITY
Start: 2023-07-20 | End: 1900-01-01

## 2023-07-20 RX ORDER — HYALURONATE SODIUM, STABILIZED 60 MG/3 ML
60 SYRINGE (ML) INTRAARTICULAR
Qty: 1 | Refills: 0 | Status: DISCONTINUED | COMMUNITY
Start: 2023-01-25 | End: 2023-07-20

## 2023-07-20 RX ORDER — TRIAMCINOLONE ACETONIDE 5 MG/G
0.5 OINTMENT TOPICAL 3 TIMES DAILY
Qty: 1 | Refills: 1 | Status: ACTIVE | COMMUNITY
Start: 2023-07-20 | End: 1900-01-01

## 2023-07-20 NOTE — PHYSICAL EXAM
[Normal] : normal sclera/conjunctiva, pupils are equal, round and reactive to light and extraocular movements are intact [Normal Outer Ear/Nose] : the outer ears and nose were normal in appearance [No JVD] : no jugular venous distention [No Respiratory Distress] : no respiratory distress  [No Edema] : there was no peripheral edema [Coordination Grossly Intact] : coordination grossly intact [No Focal Deficits] : no focal deficits [Normal Gait] : normal gait [Normal Affect] : the affect was normal [Normal Insight/Judgement] : insight and judgment were intact [de-identified] : left forearm:  faint skin lesion, pale pink, dry skin overlying; clean borders

## 2023-07-20 NOTE — ASSESSMENT
[FreeTextEntry1] : Patient is a 64yo female who presents to the office complaining of skin lesion.  Has been stable in size/shape/color for 6 months.  States occasionally pruritic and occasionally bleeds.  Not UTD with Dermatology.  Will give RX for Triamcinolone as pt has hx eczema.  Given bleeding, referral provided for Dermatology for further evaluation, r/o malignancy.  \par \par Call the office or go to the ED immediately if you develop new, worsening or concerning symptoms including high fever, severe headache/worst headache of your life, confusion, dizziness/lightheadedness, loss of consciousness, severe chest pain, difficulty breathing, shortness of breath, severe abdominal pain, excessive vomiting/diarrhea, inability to feel/move the extremities, or any other concerning symptoms.\par \par Pt overdue for CPE/HCM.\par Mammo and breast US RX provided.\par Has CPE scheduled in 01/2024.\par \par Has hx RAD, uses Albuterol occasionally/rarely.  Needed during poor air quality with the [employer] fires but could not get RX.  New RX provided.  Pt currently asymptomatic.

## 2023-07-20 NOTE — HISTORY OF PRESENT ILLNESS
[FreeTextEntry8] : Patient is a 64yo female who presents to the office complaining of skin lesion.\par States lesion has been there for about 6 weeks.\par States occasionally bleeds.\par Occasionally pruritic.\par Denies change in color or size of the lesion.\par Pt's father had hx BCC.\par Pt follows with Dermatology, Dr. Niño.\par Has hx eczema.

## 2023-12-06 NOTE — DISCHARGE NOTE PROVIDER - NSDCHC_MEDRECSTATUS_GEN_ALL_CORE
Medication Question or Refill    Contacts         Type Contact Phone/Fax    12/06/2023 10:20 AM CST Phone (Incoming) Tl Conner (Emergency Contact) [phone]            What medication are you calling about (include dose and sig)?: atorvastatin (LIPITOR) 40 MG tablet     Preferred Pharmacy:  [address] [address]  Phone: [phone] Fax: [phone]      Controlled Substance Agreement on file:   CSA -- Patient Level:    CSA: None found at the patient level.       Who prescribed the medication?: Dr. Geiger    Do you need a refill? Yes    When did you use the medication last? 11/22/23    Patient offered an appointment? No    Do you have any questions or concerns?  No      Okay to leave a detailed message?: Yes at Cell number on file:    Telephone Information:   Mobile [phone]   Mobile [phone]   Mobile [phone]       Admission Reconciliation is Completed  Discharge Reconciliation is Completed

## 2023-12-15 ENCOUNTER — RESULT CHARGE (OUTPATIENT)
Age: 63
End: 2023-12-15

## 2023-12-16 DIAGNOSIS — Z13.820 ENCOUNTER FOR SCREENING FOR OSTEOPOROSIS: ICD-10-CM

## 2023-12-16 DIAGNOSIS — Z12.39 ENCOUNTER FOR OTHER SCREENING FOR MALIGNANT NEOPLASM OF BREAST: ICD-10-CM

## 2023-12-16 DIAGNOSIS — Z83.3 FAMILY HISTORY OF DIABETES MELLITUS: ICD-10-CM

## 2023-12-16 DIAGNOSIS — Z82.49 FAMILY HISTORY OF ISCHEMIC HEART DISEASE AND OTHER DISEASES OF THE CIRCULATORY SYSTEM: ICD-10-CM

## 2023-12-16 DIAGNOSIS — Z91.09 OTHER ALLERGY STATUS, OTHER THAN TO DRUGS AND BIOLOGICAL SUBSTANCES: ICD-10-CM

## 2023-12-16 DIAGNOSIS — Z87.891 PERSONAL HISTORY OF NICOTINE DEPENDENCE: ICD-10-CM

## 2023-12-16 DIAGNOSIS — Z78.9 OTHER SPECIFIED HEALTH STATUS: ICD-10-CM

## 2023-12-16 DIAGNOSIS — Z86.010 PERSONAL HISTORY OF COLONIC POLYPS: ICD-10-CM

## 2023-12-16 RX ORDER — SUMATRIPTAN 50 MG/1
50 TABLET, FILM COATED ORAL
Refills: 0 | Status: ACTIVE | COMMUNITY

## 2023-12-20 ENCOUNTER — NON-APPOINTMENT (OUTPATIENT)
Age: 63
End: 2023-12-20

## 2023-12-20 ENCOUNTER — APPOINTMENT (OUTPATIENT)
Dept: FAMILY MEDICINE | Facility: CLINIC | Age: 63
End: 2023-12-20
Payer: COMMERCIAL

## 2023-12-20 VITALS
OXYGEN SATURATION: 98 % | SYSTOLIC BLOOD PRESSURE: 124 MMHG | TEMPERATURE: 97.4 F | DIASTOLIC BLOOD PRESSURE: 78 MMHG | HEIGHT: 66 IN | BODY MASS INDEX: 27.32 KG/M2 | WEIGHT: 170 LBS | HEART RATE: 76 BPM

## 2023-12-20 DIAGNOSIS — R92.30 DENSE BREASTS, UNSPECIFIED: ICD-10-CM

## 2023-12-20 DIAGNOSIS — Z86.69 PERSONAL HISTORY OF OTHER DISEASES OF THE NERVOUS SYSTEM AND SENSE ORGANS: ICD-10-CM

## 2023-12-20 DIAGNOSIS — Z12.11 ENCOUNTER FOR SCREENING FOR MALIGNANT NEOPLASM OF COLON: ICD-10-CM

## 2023-12-20 DIAGNOSIS — E78.5 HYPERLIPIDEMIA, UNSPECIFIED: ICD-10-CM

## 2023-12-20 DIAGNOSIS — Z00.00 ENCOUNTER FOR GENERAL ADULT MEDICAL EXAMINATION W/OUT ABNORMAL FINDINGS: ICD-10-CM

## 2023-12-20 DIAGNOSIS — H91.93 UNSPECIFIED HEARING LOSS, BILATERAL: ICD-10-CM

## 2023-12-20 DIAGNOSIS — H69.93 UNSPECIFIED EUSTACHIAN TUBE DISORDER, BILATERAL: ICD-10-CM

## 2023-12-20 DIAGNOSIS — Z12.83 ENCOUNTER FOR SCREENING FOR MALIGNANT NEOPLASM OF SKIN: ICD-10-CM

## 2023-12-20 DIAGNOSIS — J45.20 MILD INTERMITTENT ASTHMA, UNCOMPLICATED: ICD-10-CM

## 2023-12-20 PROCEDURE — 99396 PREV VISIT EST AGE 40-64: CPT | Mod: 25

## 2023-12-20 PROCEDURE — 93000 ELECTROCARDIOGRAM COMPLETE: CPT | Mod: 59

## 2023-12-20 PROCEDURE — 36415 COLL VENOUS BLD VENIPUNCTURE: CPT

## 2023-12-20 NOTE — HISTORY OF PRESENT ILLNESS
[FreeTextEntry1] : ANGÉLICA LEYVA is a 63 year old female here for a physical exam. [de-identified] : Her last physical exam was last year  Vaccines: Tetanus is NOT up to date Pneumococcal vaccination is NOT up to date (recommended due to her asthma) Shingrix is NOT up to date COVID vaccine is up to date incl had a COVID booster recently  Flu vacc not up to date   Her last dentist visit was less than one year ago Her last eye doctor appointment was less than one year ago Her last dermatologist visit was less than one year ago, saw Dr. Niño in the past but not for yrs  GYN visit is NOT up to date, 2022 and has appt 1/2024 Dr. oTney Mammogram is NOT up to date, 2/2022 mammo and US (dense breast tissue), has appt 1/2025 and needs rx Ayanna  Colon cancer screening is NOT up to date,  colonoscopy 2/2016, polyp, rpt by 5 yrs was due, Dr. Plasencia  DEXA is up to date, had in 2022 and showed osteopenia and slightly elevated FRAX risk, not osteoporosis, Dr. Tenorio talked to her about option of oral rx meds and in the end they decided to defer for now. She is working to optimize ca+/vitD, exercise  Her diet is healthy overall Exercise: yoga 1-2 times a week, strength training, cardio , walking and elliptical   Dunia has asthma, envtal allergies, h/o migraines, OA  She is UTD on follow up with her specialists incl Dr. Morrison (ortho), rheum ()   Hearing is worse over time. feels constant pressure, Hearing crackling sounds when swallows, When bloows against pinched nose that termporarlly relieves the pressure. Is [place] next [place] and will be snorkeling, but not scuba diving

## 2023-12-20 NOTE — ASSESSMENT
[FreeTextEntry1] : ANGÉLICA LEYVA is a 63 year old female here for a physical exam.  She is also here to follow up on medical issues as noted above.

## 2023-12-20 NOTE — REVIEW OF SYSTEMS
[Joint Pain] : joint pain [Joint Stiffness] : joint stiffness [Negative] : Heme/Lymph [Earache] : earache [Hearing Loss] : hearing loss [Headache] : headache [Nasal Discharge] : no nasal discharge [FreeTextEntry4] : wants to see ENT re hearing loss and chronically clogged ears  [FreeTextEntry9] : OA related joint pain and stiffness that she manages with conserv measures and keeping active [de-identified] : +migraines much less freq since in menopause and since avoid art sweeteners.

## 2023-12-20 NOTE — HEALTH RISK ASSESSMENT
[0] : 2) Feeling down, depressed, or hopeless: Not at all (0) [PHQ-2 Negative - No further assessment needed] : PHQ-2 Negative - No further assessment needed [Former] : Former [15-19] : 15-19 [> 15 Years] : > 15 Years [[id number]] : 0

## 2023-12-20 NOTE — PHYSICAL EXAM
[No Acute Distress] : no acute distress [Well Nourished] : well nourished [Well Developed] : well developed [Well-Appearing] : well-appearing [Normal Sclera/Conjunctiva] : normal sclera/conjunctiva [EOMI] : extraocular movements intact [Normal Outer Ear/Nose] : the outer ears and nose were normal in appearance [Normal Oropharynx] : the oropharynx was normal [No JVD] : no jugular venous distention [No Lymphadenopathy] : no lymphadenopathy [Supple] : supple [Thyroid Normal, No Nodules] : the thyroid was normal and there were no nodules present [No Respiratory Distress] : no respiratory distress  [No Accessory Muscle Use] : no accessory muscle use [Clear to Auscultation] : lungs were clear to auscultation bilaterally [Normal Rate] : normal rate  [Regular Rhythm] : with a regular rhythm [Normal S1, S2] : normal S1 and S2 [No Murmur] : no murmur heard [No Carotid Bruits] : no carotid bruits [No Varicosities] : no varicosities [No Edema] : there was no peripheral edema [Pedal Pulses Present] : the pedal pulses are present [No Extremity Clubbing/Cyanosis] : no extremity clubbing/cyanosis [Soft] : abdomen soft [Non Tender] : non-tender [Non-distended] : non-distended [No Masses] : no abdominal mass palpated [No HSM] : no HSM [Normal Bowel Sounds] : normal bowel sounds [Normal Posterior Cervical Nodes] : no posterior cervical lymphadenopathy [Normal Anterior Cervical Nodes] : no anterior cervical lymphadenopathy [No Joint Swelling] : no joint swelling [Grossly Normal Strength/Tone] : grossly normal strength/tone [No Rash] : no rash [Coordination Grossly Intact] : coordination grossly intact [No Focal Deficits] : no focal deficits [Normal Gait] : normal gait [Normal Affect] : the affect was normal [Normal Insight/Judgement] : insight and judgment were intact [Normal TMs] : both tympanic membranes were normal [Normal Nasal Mucosa] : the nasal mucosa was normal [de-identified] : no cerumen B

## 2023-12-20 NOTE — PLAN
[FreeTextEntry1] : Continue all medications as prescribed. Check labs as above. Will adjust any medications based upon lab results.  Reviewed age-appropriate preventive screening tests with patient. Due for gyn exam, mammogram, colonoscopy and she will schedule/has scheduled for near future. UTD on DEXA (requesting results) with rheum (Dr. Tenorio)  Due for skin check as has been years and referral made. Also referral for ENT for eval of ear chronic ET congestion/dysfunction and hearing loss.   Revd/recommended Tdap booster, PCV 20 (due to asthma) and Shingrix series, as well as annual flu vacc.   Trial nasal steroid spray, Sudafed, Ibuprofen +/- Afrin prior to flight. Trial nasal steroid spray, +/- oral decongestant for chronic ET dysfunction and see ENT if sxs persist or worsen  ECG NSR, low voltage, stable from prior in legacy system  Discussed clean eating (eg Mediterranean style more plant based eating plan) and regular exercise/staying as physically active as possible.  Include balance exercises and strength training and core strengthening exercises for bone health and to decrease risk for falls.  Recommended excellent hydration, stress reduction techniques, clean eating, get stable/adequate rest daily, consider magnesium supplementation and/or Migralief or Butterbur as prevention measures to try for migraine HA. Use NSAIDs (eg ibuprofen, Excedrin migraine) or Tylenol (if helpful) as needed along with rest in cool/dark/quiet environment ASAP prn migraine HA. Revd r/b/se Triptan meds (eg sumatriptan) and dosing ASAP at onset of sxs gives best effect.  Recommended Tylenol XS or Arthritis 1-2 pills BID-TID if helpful, ok to use NSAIDs sparingly with food (but revd r/b/se of NSAIDs incl CV, renal and GI and she should limit use as much as possible), regular stretching, heat/ice prn, consider turmeric supplementation, consider CBD cream or oral options, gentle yoga/chair yoga, Pilates, strengthen core muscles, consider chiro and/or massage and/or acupuncture. If these measures are not helpful enough then consider consultation with ortho and/or pain  for further eval and treatment.   Recommended calcium 9950-4160 mg daily ideally mainly or fully from food sources +/- supplement if needed, vit D 1984-7064 IU or whatever dose is needed to get D into 30-80 range. Recommended regular weight bearing exercise as well as strength training exercise 3 or more times per week and balance exercises regularly.  Reviewed importance of good self care (e.g. meditation, yoga, adequate rest, regular exercise, magnesium, clean eating, etc.).  Follow up with specialists as recommended by them.   Follow up for next physical in one year.

## 2023-12-21 LAB
ALBUMIN SERPL ELPH-MCNC: 4.7 G/DL
ALP BLD-CCNC: 55 U/L
ALT SERPL-CCNC: 33 U/L
ANION GAP SERPL CALC-SCNC: 12 MMOL/L
AST SERPL-CCNC: 21 U/L
BASOPHILS # BLD AUTO: 0.08 K/UL
BASOPHILS NFR BLD AUTO: 1.1 %
BILIRUB SERPL-MCNC: 0.7 MG/DL
BUN SERPL-MCNC: 15 MG/DL
CALCIUM SERPL-MCNC: 9.8 MG/DL
CHLORIDE SERPL-SCNC: 101 MMOL/L
CHOLEST SERPL-MCNC: 237 MG/DL
CO2 SERPL-SCNC: 25 MMOL/L
CREAT SERPL-MCNC: 0.75 MG/DL
EGFR: 90 ML/MIN/1.73M2
EOSINOPHIL # BLD AUTO: 0.3 K/UL
EOSINOPHIL NFR BLD AUTO: 4.3 %
GLUCOSE SERPL-MCNC: 97 MG/DL
HCT VFR BLD CALC: 42.1 %
HDLC SERPL-MCNC: 50 MG/DL
HGB BLD-MCNC: 13.7 G/DL
IMM GRANULOCYTES NFR BLD AUTO: 0.1 %
LDLC SERPL CALC-MCNC: 132 MG/DL
LYMPHOCYTES # BLD AUTO: 2.57 K/UL
LYMPHOCYTES NFR BLD AUTO: 36.7 %
MAN DIFF?: NORMAL
MCHC RBC-ENTMCNC: 31.9 PG
MCHC RBC-ENTMCNC: 32.5 GM/DL
MCV RBC AUTO: 98.1 FL
MONOCYTES # BLD AUTO: 0.84 K/UL
MONOCYTES NFR BLD AUTO: 12 %
NEUTROPHILS # BLD AUTO: 3.21 K/UL
NEUTROPHILS NFR BLD AUTO: 45.8 %
NONHDLC SERPL-MCNC: 187 MG/DL
PLATELET # BLD AUTO: 259 K/UL
POTASSIUM SERPL-SCNC: 4.6 MMOL/L
PROT SERPL-MCNC: 7 G/DL
RBC # BLD: 4.29 M/UL
RBC # FLD: 12.3 %
SODIUM SERPL-SCNC: 138 MMOL/L
TRIGL SERPL-MCNC: 311 MG/DL
TSH SERPL-ACNC: 1.58 UIU/ML
WBC # FLD AUTO: 7.01 K/UL

## 2024-01-15 ENCOUNTER — OUTPATIENT (OUTPATIENT)
Dept: OUTPATIENT SERVICES | Facility: HOSPITAL | Age: 64
LOS: 1 days | End: 2024-01-15
Payer: COMMERCIAL

## 2024-01-15 ENCOUNTER — APPOINTMENT (OUTPATIENT)
Dept: MAMMOGRAPHY | Facility: CLINIC | Age: 64
End: 2024-01-15
Payer: COMMERCIAL

## 2024-01-15 ENCOUNTER — APPOINTMENT (OUTPATIENT)
Dept: ULTRASOUND IMAGING | Facility: CLINIC | Age: 64
End: 2024-01-15
Payer: COMMERCIAL

## 2024-01-15 ENCOUNTER — RESULT REVIEW (OUTPATIENT)
Age: 64
End: 2024-01-15

## 2024-01-15 DIAGNOSIS — Z12.39 ENCOUNTER FOR OTHER SCREENING FOR MALIGNANT NEOPLASM OF BREAST: ICD-10-CM

## 2024-01-15 PROCEDURE — 76641 ULTRASOUND BREAST COMPLETE: CPT | Mod: 26,50

## 2024-01-15 PROCEDURE — 77063 BREAST TOMOSYNTHESIS BI: CPT | Mod: 26

## 2024-01-15 PROCEDURE — 77067 SCR MAMMO BI INCL CAD: CPT | Mod: 26

## 2024-01-15 PROCEDURE — 77067 SCR MAMMO BI INCL CAD: CPT

## 2024-01-15 PROCEDURE — 76641 ULTRASOUND BREAST COMPLETE: CPT

## 2024-01-15 PROCEDURE — 77063 BREAST TOMOSYNTHESIS BI: CPT

## 2024-07-18 ENCOUNTER — APPOINTMENT (OUTPATIENT)
Dept: FAMILY MEDICINE | Facility: CLINIC | Age: 64
End: 2024-07-18
Payer: COMMERCIAL

## 2024-07-18 VITALS
BODY MASS INDEX: 27 KG/M2 | HEART RATE: 79 BPM | OXYGEN SATURATION: 98 % | DIASTOLIC BLOOD PRESSURE: 62 MMHG | WEIGHT: 168 LBS | SYSTOLIC BLOOD PRESSURE: 110 MMHG | HEIGHT: 66 IN | TEMPERATURE: 97.2 F

## 2024-07-18 DIAGNOSIS — M25.561 PAIN IN RIGHT KNEE: ICD-10-CM

## 2024-07-18 DIAGNOSIS — M17.11 UNILATERAL PRIMARY OSTEOARTHRITIS, RIGHT KNEE: ICD-10-CM

## 2024-07-18 DIAGNOSIS — M25.541 PAIN IN JOINTS OF RIGHT HAND: ICD-10-CM

## 2024-07-18 DIAGNOSIS — M25.551 PAIN IN RIGHT HIP: ICD-10-CM

## 2024-07-18 PROCEDURE — 36415 COLL VENOUS BLD VENIPUNCTURE: CPT

## 2024-07-18 PROCEDURE — 99214 OFFICE O/P EST MOD 30 MIN: CPT

## 2024-07-18 RX ORDER — PROGESTERONE 100 MG/1
100 CAPSULE ORAL
Refills: 0 | Status: ACTIVE | COMMUNITY

## 2024-07-24 LAB
ANACR T: NEGATIVE
CRP SERPL-MCNC: <3 MG/L
ERYTHROCYTE [SEDIMENTATION RATE] IN BLOOD BY WESTERGREN METHOD: 2 MM/HR
RHEUMATOID FACT SER QL: 10 IU/ML

## 2024-08-05 ENCOUNTER — APPOINTMENT (OUTPATIENT)
Dept: ORTHOPEDIC SURGERY | Facility: CLINIC | Age: 64
End: 2024-08-05

## 2024-08-08 ENCOUNTER — APPOINTMENT (OUTPATIENT)
Dept: ORTHOPEDIC SURGERY | Facility: CLINIC | Age: 64
End: 2024-08-08

## 2024-08-08 PROCEDURE — 99214 OFFICE O/P EST MOD 30 MIN: CPT | Mod: 25

## 2024-08-08 PROCEDURE — 73564 X-RAY EXAM KNEE 4 OR MORE: CPT | Mod: RT

## 2024-08-08 NOTE — IMAGING
[Right] : right knee [AP] : anteroposterior [Lateral] : lateral [[place]] : skyline [de-identified] : The patient is a well appearing 62 year  old female of their stated age.  Patient ambulates with a normal gait.  Negative straight leg raise bilateral   Effected Knee: RIGHT                         	  ROM:  0-145 degrees  Lachman: Negative  Pivot Shift: Negative  Anterior Drawer: Negative  Posterior Drawer / Sag:Negative  Varus Stress 0 degrees: Stable  Varus Stress 30 degrees: Stable  Valgus Stress 0 degrees: Stable  Valgus Stress 30 degrees: Stable  Medial Kirti: Negative  Lateral Kirti: POSITIVE Patella Glide: 2+  Patella Apprehension: Negative  Patella Grind: Negative   Palpation:  Medial Joint Line: TENDER  Lateral Joint Line: TENDER Medial Collateral Ligament: Nontender  Lateral Collateral Ligament/PLC: Nontender Distal Femur: Nontender  Lateral Tibia: Nontender  Tibial Tubercle: Nontender  Distal Pole Patella: Nontender  Quadriceps Tendon: Nontender &  Intact  Patella Tendon: Nontender &  Intact  Medial Femoral Condyle: Nontender  Medial Distal Hamstring/PES: Nontender Lateral Distal Hamstring: Nontender & Stable  Iliotibial Band: Nontender  Medial Patellofemoral Ligament: Nontender  Adductor: Nontender  Proximal GSC-Plantaris: Nontender  Calf: Supple & Nontender   Inspection:  Deformity: No  Erythema: No  Ecchymosis: No  Abrasions: No  Effusion: No  Prepatella Bursitis: No  Neurologic Exam:  Sensation L4-S1: Grossly Intact  Motor Exam:  Quadriceps: 5 out of 5  Hamstrings: 5 out of 5  EHL: 5 out of 5  FHL: 5 out of 5  TA: 5 out of 5  GS: 5 out of 5  Circulatory/Pulses:  Dorsalis Pedis: 2+  Posterior Tibialis: 2+  Additional Pertinent Findings: None   Contralateral Knee:                           	  ROM: 0-145 degrees  Other Pertinent Findings: None   Assessment: The patient is a 62-year-old female with right knee pain and radiographic and physical exam findings consistent with PF OA.  The patient's condition is acute  Documents/Results Reviewed Today: X-ray right knee  Tests/Studies Independently Interpreted Today: X-Ray right knee reveals evidence of advanced patellofemoral OA, narrowing of medial and lateral joint compartment Pertinent findings include: MJLT, LJLT, + lateral kirti, patellofemoral crepitus,  Confounding medical conditions/concerns: partial lateral meniscectomy 2020  Plan: Discussed treatment options for the patient's Patellofemoral OA. Patient got a Durolane injection in Janurary 2023 and reports relief with it. Discussed VISCO supplementation with the patient today. Patient agrees to move forward, we will submit for authorization today.  Discussed appropriate use of OTC anti-inflammatories and analgesics (including but not limited to Aleve, Advil, Tylenol, Motrin, Ibuprofen, Voltaren gel, etc.) Modify activity as discussed.  Tests Ordered: None  Prescription Medications Ordered: Previously prescribed Naprosyn  Braces/DME Ordered: None  Activity/Work/Sports Status: None  Additional Instructions: None Follow-Up:  For Durolane injection  The patient's current medication management of their orthopedic diagnosis was reviewed today: (1) We discussed a comprehensive treatment plan that included possible pharmaceutical management involving the use of prescription strength medications including but not limited to options such as oral Naprosyn 500mg BID, once daily Meloxicam 15 mg, or 500-650 mg Tylenol versus over the counter oral medications and topical prescription NSAID Pennsaid vs over the counter Voltaren gel. (2) There is a moderate risk of morbidity with further treatment, especially from use of prescription strength medications and possible side effects of these medications which include upset stomach with oral medications, skin reactions to topical medications and cardiac/renal issues with long term use. (3) I recommended that the patient follow-up with their medical physician to discuss any significant specific potential issues with long term medication use such as interactions with current medications or with exacerbation of underlying medical comorbidities. (4) The benefits and risks associated with use of injectable, oral or topical, prescription and over the counter anti-inflammatory medications were discussed with the patient. The patient voiced understanding of the risks including but not limited to bleeding, stroke, kidney dysfunction, heart disease, and were referred to the black box warning label for further information.  Autumn MIRELES attest that this documentation has been prepared under the direction and in the presence of Kumar Zarco PA-C.   The documentation recorded by the scribe accurately reflects the service ALINE Zarco PA-C personally performed and the decisions made by me.   [FreeTextEntry9] :  X-Ray right knee reveals evidence of advanced patellofemoral OA, narrowing of medial and lateral joint compartment

## 2024-08-08 NOTE — HISTORY OF PRESENT ILLNESS
[de-identified] : The patient is a 64 year  old right hand dominant female who presents today complaining of right knee pain  Date of Injury/Onset: 10/1/22; Re-injured: 1/6/23 Pain:    At Rest: 3/10  With Activity:  6-7/10  Mechanism of injury: hx of knee pain, pain has gotten progressively worse the last 2 months with no RYNE This is NOT a Work Related Injury being treated under Worker's Compensation. This is NOT an athletic injury occurring associated with an interscholastic or organized sports team. 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 Knee pain returned 5/2024 with no new RYNE.  Additional Information: meniscectomy Dr. Morrison ~2021

## 2024-08-23 ENCOUNTER — APPOINTMENT (OUTPATIENT)
Dept: ORTHOPEDIC SURGERY | Facility: CLINIC | Age: 64
End: 2024-08-23
Payer: COMMERCIAL

## 2024-08-23 VITALS — BODY MASS INDEX: 27 KG/M2 | HEIGHT: 66 IN | WEIGHT: 168 LBS

## 2024-08-23 PROCEDURE — 20611 DRAIN/INJ JOINT/BURSA W/US: CPT | Mod: RT

## 2024-08-23 NOTE — IMAGING
[de-identified] : The patient is a well appearing 62 year  old female of their stated age.  Patient ambulates with a normal gait.  Negative straight leg raise bilateral   Effected Knee: RIGHT                         	  ROM:  0-145 degrees  Lachman: Negative  Pivot Shift: Negative  Anterior Drawer: Negative  Posterior Drawer / Sag:Negative  Varus Stress 0 degrees: Stable  Varus Stress 30 degrees: Stable  Valgus Stress 0 degrees: Stable  Valgus Stress 30 degrees: Stable  Medial Kirti: Negative  Lateral Kirti: POSITIVE Patella Glide: 2+  Patella Apprehension: Negative  Patella Grind: Negative   Palpation:  Medial Joint Line: TENDER  Lateral Joint Line: TENDER Medial Collateral Ligament: Nontender  Lateral Collateral Ligament/PLC: Nontender Distal Femur: Nontender  Lateral Tibia: Nontender  Tibial Tubercle: Nontender  Distal Pole Patella: Nontender  Quadriceps Tendon: Nontender &  Intact  Patella Tendon: Nontender &  Intact  Medial Femoral Condyle: Nontender  Medial Distal Hamstring/PES: Nontender Lateral Distal Hamstring: Nontender & Stable  Iliotibial Band: Nontender  Medial Patellofemoral Ligament: Nontender  Adductor: Nontender  Proximal GSC-Plantaris: Nontender  Calf: Supple & Nontender   Inspection:  Deformity: No  Erythema: No  Ecchymosis: No  Abrasions: No  Effusion: No  Prepatella Bursitis: No  Neurologic Exam:  Sensation L4-S1: Grossly Intact  Motor Exam:  Quadriceps: 5 out of 5  Hamstrings: 5 out of 5  EHL: 5 out of 5  FHL: 5 out of 5  TA: 5 out of 5  GS: 5 out of 5  Circulatory/Pulses:  Dorsalis Pedis: 2+  Posterior Tibialis: 2+  Additional Pertinent Findings: None   Contralateral Knee:                           	  ROM: 0-145 degrees  Other Pertinent Findings: None   Assessment: The patient is a 62-year-old female with right knee pain and radiographic and physical exam findings consistent with PF OA.  The patient's condition is acute  Documents/Results Reviewed Today: None Tests/Studies Independently Interpreted Today: None  Pertinent findings include: MJLT, LJLT, + lateral kirti, patellofemoral crepitus,  Confounding medical conditions/concerns: partial lateral meniscectomy 2020  Plan: Discussed treatment options for the patient's Patellofemoral OA.  Patient received Right knee Durolane injection.  Discussed appropriate use of OTC anti-inflammatories and analgesics (including but not limited to Aleve, Advil, Tylenol, Motrin, Ibuprofen, Voltaren gel, etc.) Modify activity as discussed.  Tests Ordered: None  Prescription Medications Ordered: Previously prescribed Naprosyn  Braces/DME Ordered: None  Activity/Work/Sports Status: None  Additional Instructions: None Follow-Up:  6 months  Procedure Note: Musculoskeletal Injection Diagnosis: Right knee OA Procedure: Right knee, superolateral, Durolane injection   Indication:  The patient has had persistent pain despite conservative treatment.  Risks, benefits and alternatives to procedure were discussed; all questions were answered to the patient's apparent satisfaction and informed consent obtained.  The patient denied prior problems with local anesthetics, injectable cortisones, chicken allergy, coagulopathy and no relevant drug or preservative allergies or sensitivities. The area of injection was prepared in a sterile fashion.  Prior to injection a 'Time Out' was conducted in accordance with [facility] policy and the site and nature of procedure verified with the patient.   Procedure: The procedure was carried out utilizing sterile technique from a superolateral arthroscopic portal position. The needle was placed under ultrasound guidance to improve accuracy and minimize risk to the patient and diagnostic ultrasound in the long and short axis revealed OA   0cc of clear synovial fluid was aspirated. The specimen: (X) appeared benign and was discarded ( ) was sent for Culture / Cell Count / Crystal analysis / [_].]   Injection into the target area with care taken to aspirate frequently to minimize the risk of intravascular injection was performed with:   ( ) 1cc of Depomedrol (80mg/ml) ( ) 1cc of Dexamethasone (10mg/ml) ( ) 1cc of Toradol (30mg/ml) ( ) 9cc of 0.5% Bupivacaine (X) 5 cc of 1% Lidocaine ( ) 5cc of 32mg Zilretta, prepared and diluted per  instructions ( ) 2 cc of Hylan G-F 20 (Synvisc) 16mg/2ml ( ) 6 cc of Hylan G-F 20 (SynvisoOne) 16mg/2ml ( ) 2cc of Euflexxa ( ) 2cc of Orthovisc ( ) 2cc of GelOne (X) 3cc of Durolane (20mg/ml)     Patient tolerated the procedure well and direct pressure was applied for hemostasis. The patient was reminded of potential post-injection risks including, but not limited to, delayed hypersensitivity reactions and/or infection.  The patient verified that they had the office and the Emergency Room's contact information if any problems should arise.  After several minutes, the patient informed me that they felt fine and was released from the office.  The patient's current medication management of their orthopedic diagnosis was reviewed today: (1) We discussed a comprehensive treatment plan that included possible pharmaceutical management involving the use of prescription strength medications including but not limited to options such as oral Naprosyn 500mg BID, once daily Meloxicam 15 mg, or 500-650 mg Tylenol versus over-the-counter oral medications and topical prescription NSAID Pennsaid vs over the counter Voltaren gel. (2) There is a moderate risk of morbidity with further treatment, especially from use of prescription strength medications and possible side effects of these medications which include upset stomach with oral medications, skin reactions to topical medications and cardiac/renal issues with long term use. (3) I recommended that the patient follow-up with their medical physician to discuss any significant specific potential issues with long term medication use such as interactions with current medications or with exacerbation of underlying medical comorbidities. (4) The benefits and risks associated with use of injectable, oral or topical, prescription and over the counter anti-inflammatory medications were discussed with the patient. The patient voiced understanding of the risks including but not limited to bleeding, stroke, kidney dysfunction, heart disease, and were referred to the black box warning label for further information.  Autumn MIRELES attest that this documentation has been prepared under the direction and in the presence of Kumar Zarco PA-C.  The documentation recorded by the scribe accurately reflects the service ALINE Zarco PA-C personally performed and the decisions made by me.

## 2024-08-23 NOTE — HISTORY OF PRESENT ILLNESS
[de-identified] : The patient is a 64 year  old right hand dominant female who presents today complaining of right knee pain  Date of Injury/Onset: 10/1/22; Re-injured: 1/6/23 Pain:    At Rest: 3/10  With Activity:  6-7/10  Mechanism of injury: hx of knee pain, pain has gotten progressively worse the last 2 months with no RYNE This is NOT a Work Related Injury being treated under Worker's Compensation. This is NOT an athletic injury occurring associated with an interscholastic or organized sports team. 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 Interested in possible gel injection today.  Additional Information: meniscectomy Dr. Morrison ~2021

## 2024-08-26 ENCOUNTER — APPOINTMENT (OUTPATIENT)
Dept: ORTHOPEDIC SURGERY | Facility: CLINIC | Age: 64
End: 2024-08-26
Payer: COMMERCIAL

## 2024-08-26 VITALS — WEIGHT: 168 LBS | BODY MASS INDEX: 27 KG/M2 | HEIGHT: 66 IN

## 2024-08-26 DIAGNOSIS — M17.11 UNILATERAL PRIMARY OSTEOARTHRITIS, RIGHT KNEE: ICD-10-CM

## 2024-08-26 PROCEDURE — 99213 OFFICE O/P EST LOW 20 MIN: CPT

## 2024-08-26 PROCEDURE — 73564 X-RAY EXAM KNEE 4 OR MORE: CPT | Mod: RT

## 2024-08-26 RX ORDER — NAPROXEN 500 MG/1
500 TABLET ORAL
Qty: 30 | Refills: 0 | Status: ACTIVE | COMMUNITY
Start: 2024-08-26 | End: 1900-01-01

## 2024-08-27 NOTE — IMAGING
[Right] : right knee [de-identified] : The patient is a well appearing 62 year  old female of their stated age.  Patient ambulates with a normal gait.  Negative straight leg raise bilateral   Effected Knee: RIGHT                         	  ROM:  0-145 degrees  Lachman: Negative  Pivot Shift: Negative  Anterior Drawer: Negative  Posterior Drawer / Sag:Negative  Varus Stress 0 degrees: Stable  Varus Stress 30 degrees: Stable  Valgus Stress 0 degrees: Stable  Valgus Stress 30 degrees: Stable  Medial Kirti: Negative  Lateral Kirti: POSITIVE Patella Glide: 2+  Patella Apprehension: Negative  Patella Grind: Negative   Palpation:  Medial Joint Line: TENDER  Lateral Joint Line: TENDER Medial Collateral Ligament: Nontender  Lateral Collateral Ligament/PLC: Nontender Distal Femur: Nontender  Lateral Tibia: Nontender  Tibial Tubercle: Nontender  Distal Pole Patella: Nontender  Quadriceps Tendon: Nontender &  Intact  Patella Tendon: Nontender &  Intact  Medial Femoral Condyle: Nontender  Medial Distal Hamstring/PES: Nontender Lateral Distal Hamstring: Nontender & Stable  Iliotibial Band: Nontender  Medial Patellofemoral Ligament: Nontender  Adductor: Nontender  Proximal GSC-Plantaris: Nontender  Calf: Supple & Nontender   Inspection:  Deformity: No  Erythema: No  Ecchymosis: No  Abrasions: No  Effusion: No  Prepatella Bursitis: No  Neurologic Exam:  Sensation L4-S1: Grossly Intact  Motor Exam:  Quadriceps: 5 out of 5  Hamstrings: 5 out of 5  EHL: 5 out of 5  FHL: 5 out of 5  TA: 5 out of 5  GS: 5 out of 5  Circulatory/Pulses:  Dorsalis Pedis: 2+  Posterior Tibialis: 2+  Additional Pertinent Findings: None   Contralateral Knee:                           	  ROM: 0-145 degrees  Other Pertinent Findings: None   Assessment: The patient is a 62-year-old female with right knee pain and radiographic and physical exam findings consistent with PF OA.  The patient's condition is acute  Documents/Results Reviewed Today: X-Ray right knee Tests/Studies Independently Interpreted Today: X-Ray right knee reveals evidence of patellofemoral OA and narrowing of lateral joint compartment Pertinent findings include: MJLT, LJLT, + lateral kirti, patellofemoral crepitus, Tender lateral joint compartment Confounding medical conditions/concerns: partial lateral meniscectomy 2020  Plan: Patient had a recent Druolane injection on 8/23/24 and is now reporting pain along her lateral joint compartment after trying to step on a bug. Discussed with patient that she has reaggravated her arthritis. Recommended icing. Recommended wearing a Reparel sleeve. Prescribed patient Naproxen 500mg BID x 2 weeks, then PRN for pain management and inflammation - use as directed and take with food. Modify activity as discussed.  Tests Ordered: None  Prescription Medications Ordered: Naprosyn 500mg   Braces/DME Ordered: Reparel sleeve  Activity/Work/Sports Status: None  Additional Instructions: None Follow-Up: 6 months   The patient's current medication management of their orthopedic diagnosis was reviewed today:  (1) We discussed a comprehensive treatment plan that included possible pharmaceutical management involving the use of prescription strength medications including but not limited to options such as oral Naprosyn 500mg BID, once daily Meloxicam 15 mg, or 500-650 mg Tylenol versus over the counter oral medications and topical prescription NSAID Pennsaid vs over the counter Voltaren gel.  Based on our extensive discussion, the patient was prescribed Naprosyn 500mg BID for two weeks.  It will then be used PRN for pain, inflammation and discomfort.  (2) There is a moderate risk of morbidity with further treatment, especially from use of prescription strength medications and possible side effects of these medications which include upset stomach with oral medications, skin reactions to topical medications and cardiac/renal issues with long term use.  (3) I recommended that the patient follow-up with their medical physician to discuss any significant specific potential issues with long term medication use such as interactions with current medications or with exacerbation of underlying medical comorbidities.  (4) The benefits and risks associated with use of injectable, oral or topical, prescription and over the counter anti-inflammatory medications were discussed with the patient. The patient voiced understanding of the risks including but not limited to bleeding, stroke, kidney dysfunction, heart disease, and were referred to the black box warning label for further information.  IAutumn attest that this documentation has been prepared under the direction and in the presence of Provider Dr. Cecil Morrison.   The documentation recorded by the scribe accurately reflects the service ALINE Zarco PA-C personally performed and the decisions made by me. The patient was seen by me under the direct supervision of Dr. Cecil Morrison   [FreeTextEntry9] : X-Ray right knee reveals evidence of patellofemoral OA and narrowing of lateral joint compartment

## 2024-08-27 NOTE — IMAGING
[Right] : right knee [de-identified] : The patient is a well appearing 62 year  old female of their stated age.  Patient ambulates with a normal gait.  Negative straight leg raise bilateral   Effected Knee: RIGHT                         	  ROM:  0-145 degrees  Lachman: Negative  Pivot Shift: Negative  Anterior Drawer: Negative  Posterior Drawer / Sag:Negative  Varus Stress 0 degrees: Stable  Varus Stress 30 degrees: Stable  Valgus Stress 0 degrees: Stable  Valgus Stress 30 degrees: Stable  Medial Kirti: Negative  Lateral Kirti: POSITIVE Patella Glide: 2+  Patella Apprehension: Negative  Patella Grind: Negative   Palpation:  Medial Joint Line: TENDER  Lateral Joint Line: TENDER Medial Collateral Ligament: Nontender  Lateral Collateral Ligament/PLC: Nontender Distal Femur: Nontender  Lateral Tibia: Nontender  Tibial Tubercle: Nontender  Distal Pole Patella: Nontender  Quadriceps Tendon: Nontender &  Intact  Patella Tendon: Nontender &  Intact  Medial Femoral Condyle: Nontender  Medial Distal Hamstring/PES: Nontender Lateral Distal Hamstring: Nontender & Stable  Iliotibial Band: Nontender  Medial Patellofemoral Ligament: Nontender  Adductor: Nontender  Proximal GSC-Plantaris: Nontender  Calf: Supple & Nontender   Inspection:  Deformity: No  Erythema: No  Ecchymosis: No  Abrasions: No  Effusion: No  Prepatella Bursitis: No  Neurologic Exam:  Sensation L4-S1: Grossly Intact  Motor Exam:  Quadriceps: 5 out of 5  Hamstrings: 5 out of 5  EHL: 5 out of 5  FHL: 5 out of 5  TA: 5 out of 5  GS: 5 out of 5  Circulatory/Pulses:  Dorsalis Pedis: 2+  Posterior Tibialis: 2+  Additional Pertinent Findings: None   Contralateral Knee:                           	  ROM: 0-145 degrees  Other Pertinent Findings: None   Assessment: The patient is a 62-year-old female with right knee pain and radiographic and physical exam findings consistent with PF OA.  The patient's condition is acute  Documents/Results Reviewed Today: X-Ray right knee Tests/Studies Independently Interpreted Today: X-Ray right knee reveals evidence of patellofemoral OA and narrowing of lateral joint compartment Pertinent findings include: MJLT, LJLT, + lateral kirti, patellofemoral crepitus, Tender lateral joint compartment Confounding medical conditions/concerns: partial lateral meniscectomy 2020  Plan: Patient had a recent Druolane injection on 8/23/24 and is now reporting pain along her lateral joint compartment after trying to step on a bug. Discussed with patient that she has reaggravated her arthritis. Recommended icing. Recommended wearing a Reparel sleeve. Prescribed patient Naproxen 500mg BID x 2 weeks, then PRN for pain management and inflammation - use as directed and take with food. Modify activity as discussed.  Tests Ordered: None  Prescription Medications Ordered: Naprosyn 500mg   Braces/DME Ordered: Reparel sleeve  Activity/Work/Sports Status: None  Additional Instructions: None Follow-Up: 6 months   The patient's current medication management of their orthopedic diagnosis was reviewed today:  (1) We discussed a comprehensive treatment plan that included possible pharmaceutical management involving the use of prescription strength medications including but not limited to options such as oral Naprosyn 500mg BID, once daily Meloxicam 15 mg, or 500-650 mg Tylenol versus over the counter oral medications and topical prescription NSAID Pennsaid vs over the counter Voltaren gel.  Based on our extensive discussion, the patient was prescribed Naprosyn 500mg BID for two weeks.  It will then be used PRN for pain, inflammation and discomfort.  (2) There is a moderate risk of morbidity with further treatment, especially from use of prescription strength medications and possible side effects of these medications which include upset stomach with oral medications, skin reactions to topical medications and cardiac/renal issues with long term use.  (3) I recommended that the patient follow-up with their medical physician to discuss any significant specific potential issues with long term medication use such as interactions with current medications or with exacerbation of underlying medical comorbidities.  (4) The benefits and risks associated with use of injectable, oral or topical, prescription and over the counter anti-inflammatory medications were discussed with the patient. The patient voiced understanding of the risks including but not limited to bleeding, stroke, kidney dysfunction, heart disease, and were referred to the black box warning label for further information.  IAutumn attest that this documentation has been prepared under the direction and in the presence of Provider Dr. Cecil Morrison.   The documentation recorded by the scribe accurately reflects the service ALINE Zarco PA-C personally performed and the decisions made by me. The patient was seen by me under the direct supervision of Dr. Cecil Morrison   [FreeTextEntry9] : X-Ray right knee reveals evidence of patellofemoral OA and narrowing of lateral joint compartment

## 2024-08-27 NOTE — HISTORY OF PRESENT ILLNESS
[de-identified] : The patient is a 64 year  old right hand dominant female who presents today complaining of right knee pain  Date of Injury/Onset: 10/1/22; Re-injured: 1/6/23, Another re-injury 8/26/24 Pain:    At Rest: 5/10  With Activity:  9/10  Mechanism of injury: hx of knee pain, pain has gotten progressively worse the last 2 months with no RYNE. Reinjured on 8/26/24 by stepping on a fly on the ground and felt the knee buckle  This is NOT a Work Related Injury being treated under Worker's Compensation. This is NOT an athletic injury occurring associated with an interscholastic or organized sports team. 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 She was very sore after the injection into 8/24/24. On 8/25/24 she attempted to step on a lantern fly and felt an intense pain and a buckling in her knee. Denies any redness, warm, fevers after the injection. Some injection site swelling. Pain is intense and ROM is limited to where she needs to ambulate with two crutches.  Additional Information: meniscectomy Dr. Morrison ~2021

## 2024-09-09 ENCOUNTER — APPOINTMENT (OUTPATIENT)
Dept: ORTHOPEDIC SURGERY | Facility: CLINIC | Age: 64
End: 2024-09-09
Payer: COMMERCIAL

## 2024-09-09 VITALS — WEIGHT: 168 LBS | BODY MASS INDEX: 27 KG/M2 | HEIGHT: 66 IN

## 2024-09-09 DIAGNOSIS — M17.11 UNILATERAL PRIMARY OSTEOARTHRITIS, RIGHT KNEE: ICD-10-CM

## 2024-09-09 DIAGNOSIS — M25.561 PAIN IN RIGHT KNEE: ICD-10-CM

## 2024-09-09 PROCEDURE — 99213 OFFICE O/P EST LOW 20 MIN: CPT

## 2024-09-10 NOTE — HISTORY OF PRESENT ILLNESS
[de-identified] : The patient is a 64 year old right hand dominant female who presents today complaining of right knee pain  Date of Injury/Onset: 10/1/22; Re-injured: 1/6/23, Another re-injury 8/26/24 Pain:    At Rest: 3/10  With Activity:  5-6/10  Mechanism of injury: hx of knee pain, pain has gotten progressively worse the last 2 months with no RYNE. Reinjured on 8/26/24 by stepping on a fly on the ground and felt the knee buckle  This is NOT a Work Related Injury being treated under Worker's Compensation. This is NOT an athletic injury occurring associated with an interscholastic or organized sports team. Quality of symptoms: sharp lateral and posterior knee pain, limited ROM, swelling  Improves with: rest, NSAIDs Worse with: going stand to sit, prolonged standing, end range flexion.  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 In PT. Able to walk without crutches. Complaints of swelling, going stand to sit, prolonged standing, end range flexion.  Additional Information: meniscectomy Dr. Morrison ~2021

## 2024-09-10 NOTE — HISTORY OF PRESENT ILLNESS
[de-identified] : The patient is a 64 year old right hand dominant female who presents today complaining of right knee pain  Date of Injury/Onset: 10/1/22; Re-injured: 1/6/23, Another re-injury 8/26/24 Pain:    At Rest: 3/10  With Activity:  5-6/10  Mechanism of injury: hx of knee pain, pain has gotten progressively worse the last 2 months with no RYNE. Reinjured on 8/26/24 by stepping on a fly on the ground and felt the knee buckle  This is NOT a Work Related Injury being treated under Worker's Compensation. This is NOT an athletic injury occurring associated with an interscholastic or organized sports team. Quality of symptoms: sharp lateral and posterior knee pain, limited ROM, swelling  Improves with: rest, NSAIDs Worse with: going stand to sit, prolonged standing, end range flexion.  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 In PT. Able to walk without crutches. Complaints of swelling, going stand to sit, prolonged standing, end range flexion.  Additional Information: meniscectomy Dr. Morrison ~2021

## 2024-09-10 NOTE — IMAGING
[de-identified] : The patient is a well appearing 64 year  old female of their stated age.  Patient ambulates with a normal gait.  Negative straight leg raise bilateral   Effected Knee: RIGHT                         	  ROM:  0-130 degrees  Lachman: Negative  Pivot Shift: Negative  Anterior Drawer: Negative  Posterior Drawer / Sag:Negative  Varus Stress 0 degrees: Stable  Varus Stress 30 degrees: Stable  Valgus Stress 0 degrees: Stable  Valgus Stress 30 degrees: Stable  Medial Kirti: Negative  Lateral Kirti: POSITIVE Patella Glide: 2+  Patella Apprehension: Negative  Patella Grind: Negative   Palpation:  Medial Joint Line: Nontender  Lateral Joint Line: TENDER Medial Collateral Ligament: Nontender  Lateral Collateral Ligament/PLC: Nontender Distal Femur: Nontender  Lateral Tibia: Nontender  Tibial Tubercle: Nontender  Distal Pole Patella: Nontender  Quadriceps Tendon: Nontender &  Intact  Patella Tendon: Nontender &  Intact  Medial Femoral Condyle: Nontender  Medial Distal Hamstring/PES: Nontender Lateral Distal Hamstring: Nontender & Stable  Iliotibial Band: Nontender  Medial Patellofemoral Ligament: Nontender  Adductor: Nontender  Proximal GSC-Plantaris: Nontender  Calf: Supple & Nontender   Inspection:  Deformity: No  Erythema: No  Ecchymosis: No  Abrasions: No  Effusion: No  Prepatella Bursitis: No  Neurologic Exam:  Sensation L4-S1: Grossly Intact  Motor Exam:  Quadriceps: 5 out of 5  Hamstrings: 5 out of 5  EHL: 5 out of 5  FHL: 5 out of 5  TA: 5 out of 5  GS: 5 out of 5  Circulatory/Pulses:  Dorsalis Pedis: 2+  Posterior Tibialis: 2+  Additional Pertinent Findings: None   Contralateral Knee:                           	  ROM: 0-145 degrees  Other Pertinent Findings: None   Assessment: The patient is a 64-year-old female with right knee pain and radiographic and physical exam findings consistent with patellofemoral arthritis.  The patient's condition is acute  Documents/Results Reviewed Today: None  Tests/Studies Independently Interpreted Today: None  Pertinent findings include: 0-130, LJLT, + lateral kirti, patellofemoral crepitus.  Confounding medical conditions/concerns: partial lateral meniscectomy 2020  Plan: Overall, the patents condition has significantly improved although still experiencing some lateral joint aggravation. Her last Visco injection was 8/23/24, we discussed the timing of injections and not a candidate for repeat for 6 months. Continue physical therapy to work on strengthening. Recommended she utilize Reparel knee sleeve. Continue use of previously prescribed Naproxen 500mg for pain management and inflammation - use as directed and take with food. Continue to advance activity as tolerated. Modify activity as discussed.  Tests Ordered: None  Prescription Medications Ordered: Continue PRN use of Naprosyn 500mg   Braces/DME Ordered: Continue Reparel sleeve  Activity/Work/Sports Status: None  Additional Instructions: None Follow-Up: PRN   The patient's current medication management of their orthopedic diagnosis was reviewed today: (1) The patient will continue with the PRN use of their prescribed anti-inflammatory. (2) There is a moderate risk of morbidity with further treatment, especially from use of prescription strength medications and possible side effects of these medications which include upset stomach with oral medications, skin reactions to topical medications and cardiac/renal issues with long term use. (3) I recommended that the patient follow-up with their medical physician to discuss any significant specific potential issues with long term medication use such as interactions with current medications or with exacerbation of underlying medical comorbidities. (4) The benefits and risks associated with use of injectable, oral or topical, prescription and over the counter anti-inflammatory medications were discussed with the patient. The patient voiced understanding of the risks including but not limited to bleeding, stroke, kidney dysfunction, heart disease, and were referred to the black box warning label for further information.  Florinda MIRELES attest that this documentation has been prepared under the direction and in the presence of Provider Dr. Cecil Morrison.   The documentation recorded by the scribe accurately reflects the services IDr. Cecil, personally performed and the decisions made by me.

## 2024-09-10 NOTE — IMAGING
[de-identified] : The patient is a well appearing 64 year  old female of their stated age.  Patient ambulates with a normal gait.  Negative straight leg raise bilateral   Effected Knee: RIGHT                         	  ROM:  0-130 degrees  Lachman: Negative  Pivot Shift: Negative  Anterior Drawer: Negative  Posterior Drawer / Sag:Negative  Varus Stress 0 degrees: Stable  Varus Stress 30 degrees: Stable  Valgus Stress 0 degrees: Stable  Valgus Stress 30 degrees: Stable  Medial Kirti: Negative  Lateral Kirti: POSITIVE Patella Glide: 2+  Patella Apprehension: Negative  Patella Grind: Negative   Palpation:  Medial Joint Line: Nontender  Lateral Joint Line: TENDER Medial Collateral Ligament: Nontender  Lateral Collateral Ligament/PLC: Nontender Distal Femur: Nontender  Lateral Tibia: Nontender  Tibial Tubercle: Nontender  Distal Pole Patella: Nontender  Quadriceps Tendon: Nontender &  Intact  Patella Tendon: Nontender &  Intact  Medial Femoral Condyle: Nontender  Medial Distal Hamstring/PES: Nontender Lateral Distal Hamstring: Nontender & Stable  Iliotibial Band: Nontender  Medial Patellofemoral Ligament: Nontender  Adductor: Nontender  Proximal GSC-Plantaris: Nontender  Calf: Supple & Nontender   Inspection:  Deformity: No  Erythema: No  Ecchymosis: No  Abrasions: No  Effusion: No  Prepatella Bursitis: No  Neurologic Exam:  Sensation L4-S1: Grossly Intact  Motor Exam:  Quadriceps: 5 out of 5  Hamstrings: 5 out of 5  EHL: 5 out of 5  FHL: 5 out of 5  TA: 5 out of 5  GS: 5 out of 5  Circulatory/Pulses:  Dorsalis Pedis: 2+  Posterior Tibialis: 2+  Additional Pertinent Findings: None   Contralateral Knee:                           	  ROM: 0-145 degrees  Other Pertinent Findings: None   Assessment: The patient is a 64-year-old female with right knee pain and radiographic and physical exam findings consistent with patellofemoral arthritis.  The patient's condition is acute  Documents/Results Reviewed Today: None  Tests/Studies Independently Interpreted Today: None  Pertinent findings include: 0-130, LJLT, + lateral kirti, patellofemoral crepitus.  Confounding medical conditions/concerns: partial lateral meniscectomy 2020  Plan: Overall, the patents condition has significantly improved although still experiencing some lateral joint aggravation. Her last Visco injection was 8/23/24, we discussed the timing of injections and not a candidate for repeat for 6 months. Continue physical therapy to work on strengthening. Recommended she utilize Reparel knee sleeve. Continue use of previously prescribed Naproxen 500mg for pain management and inflammation - use as directed and take with food. Continue to advance activity as tolerated. Modify activity as discussed.  Tests Ordered: None  Prescription Medications Ordered: Continue PRN use of Naprosyn 500mg   Braces/DME Ordered: Continue Reparel sleeve  Activity/Work/Sports Status: None  Additional Instructions: None Follow-Up: PRN   The patient's current medication management of their orthopedic diagnosis was reviewed today: (1) The patient will continue with the PRN use of their prescribed anti-inflammatory. (2) There is a moderate risk of morbidity with further treatment, especially from use of prescription strength medications and possible side effects of these medications which include upset stomach with oral medications, skin reactions to topical medications and cardiac/renal issues with long term use. (3) I recommended that the patient follow-up with their medical physician to discuss any significant specific potential issues with long term medication use such as interactions with current medications or with exacerbation of underlying medical comorbidities. (4) The benefits and risks associated with use of injectable, oral or topical, prescription and over the counter anti-inflammatory medications were discussed with the patient. The patient voiced understanding of the risks including but not limited to bleeding, stroke, kidney dysfunction, heart disease, and were referred to the black box warning label for further information.  Florinda MIRELES attest that this documentation has been prepared under the direction and in the presence of Provider Dr. Cecil Morrison.   The documentation recorded by the scribe accurately reflects the services IDr. Cecil, personally performed and the decisions made by me.

## 2024-09-12 ENCOUNTER — APPOINTMENT (OUTPATIENT)
Dept: FAMILY MEDICINE | Facility: CLINIC | Age: 64
End: 2024-09-12
Payer: COMMERCIAL

## 2024-09-12 VITALS
OXYGEN SATURATION: 97 % | DIASTOLIC BLOOD PRESSURE: 68 MMHG | TEMPERATURE: 97.6 F | HEIGHT: 66 IN | WEIGHT: 168 LBS | BODY MASS INDEX: 27 KG/M2 | SYSTOLIC BLOOD PRESSURE: 112 MMHG | HEART RATE: 86 BPM

## 2024-09-12 DIAGNOSIS — R39.9 UNSPECIFIED SYMPTOMS AND SIGNS INVOLVING THE GENITOURINARY SYSTEM: ICD-10-CM

## 2024-09-12 LAB
BILIRUB UR QL STRIP: NEGATIVE
CLARITY UR: CLEAR
COLLECTION METHOD: NORMAL
GLUCOSE UR-MCNC: NEGATIVE
HCG UR QL: 0.2 EU/DL
HGB UR QL STRIP.AUTO: NEGATIVE
KETONES UR-MCNC: NEGATIVE
LEUKOCYTE ESTERASE UR QL STRIP: NEGATIVE
NITRITE UR QL STRIP: NEGATIVE
PH UR STRIP: 5.5
PROT UR STRIP-MCNC: NEGATIVE
SP GR UR STRIP: 1.02

## 2024-09-12 PROCEDURE — 99213 OFFICE O/P EST LOW 20 MIN: CPT

## 2024-09-12 PROCEDURE — 81003 URINALYSIS AUTO W/O SCOPE: CPT | Mod: QW

## 2024-09-12 RX ORDER — NITROFURANTOIN (MONOHYDRATE/MACROCRYSTALS) 25; 75 MG/1; MG/1
100 CAPSULE ORAL
Qty: 10 | Refills: 0 | Status: ACTIVE | COMMUNITY
Start: 2024-09-12 | End: 1900-01-01

## 2024-09-12 NOTE — PLAN
[FreeTextEntry1] : - UA is clean, will send abx since going away  - if symptoms persist or worsen, can take abx otherwise wait for culture results  - increase water intake - cranberry supplement

## 2024-09-12 NOTE — HISTORY OF PRESENT ILLNESS
[FreeTextEntry8] : - yesterday morning developed urinary frequency and foul odor  - denies dysuria, hematuria, flank pain, fever  - drinks 6-8 glasses of water per day  - took a uristat  - going away this weekend

## 2024-09-14 LAB — BACTERIA UR CULT: NORMAL

## 2025-01-17 ENCOUNTER — APPOINTMENT (OUTPATIENT)
Dept: FAMILY MEDICINE | Facility: CLINIC | Age: 65
End: 2025-01-17
Payer: COMMERCIAL

## 2025-01-17 ENCOUNTER — NON-APPOINTMENT (OUTPATIENT)
Age: 65
End: 2025-01-17

## 2025-01-17 VITALS
OXYGEN SATURATION: 98 % | SYSTOLIC BLOOD PRESSURE: 132 MMHG | WEIGHT: 174 LBS | TEMPERATURE: 97.7 F | HEART RATE: 80 BPM | DIASTOLIC BLOOD PRESSURE: 68 MMHG | HEIGHT: 66 IN | BODY MASS INDEX: 27.97 KG/M2

## 2025-01-17 DIAGNOSIS — M17.11 UNILATERAL PRIMARY OSTEOARTHRITIS, RIGHT KNEE: ICD-10-CM

## 2025-01-17 DIAGNOSIS — Z00.00 ENCOUNTER FOR GENERAL ADULT MEDICAL EXAMINATION W/OUT ABNORMAL FINDINGS: ICD-10-CM

## 2025-01-17 DIAGNOSIS — M85.80 OTHER SPECIFIED DISORDERS OF BONE DENSITY AND STRUCTURE, UNSPECIFIED SITE: ICD-10-CM

## 2025-01-17 DIAGNOSIS — J45.20 MILD INTERMITTENT ASTHMA, UNCOMPLICATED: ICD-10-CM

## 2025-01-17 DIAGNOSIS — E78.5 HYPERLIPIDEMIA, UNSPECIFIED: ICD-10-CM

## 2025-01-17 DIAGNOSIS — Z91.09 OTHER ALLERGY STATUS, OTHER THAN TO DRUGS AND BIOLOGICAL SUBSTANCES: ICD-10-CM

## 2025-01-17 DIAGNOSIS — Z86.69 PERSONAL HISTORY OF OTHER DISEASES OF THE NERVOUS SYSTEM AND SENSE ORGANS: ICD-10-CM

## 2025-01-17 DIAGNOSIS — Z12.11 ENCOUNTER FOR SCREENING FOR MALIGNANT NEOPLASM OF COLON: ICD-10-CM

## 2025-01-17 PROCEDURE — 93000 ELECTROCARDIOGRAM COMPLETE: CPT

## 2025-01-17 PROCEDURE — 99396 PREV VISIT EST AGE 40-64: CPT

## 2025-01-17 PROCEDURE — 36415 COLL VENOUS BLD VENIPUNCTURE: CPT

## 2025-01-17 PROCEDURE — 99213 OFFICE O/P EST LOW 20 MIN: CPT | Mod: 25

## 2025-01-17 RX ORDER — CHROMIUM 200 MCG
TABLET ORAL
Refills: 0 | Status: ACTIVE | COMMUNITY

## 2025-01-17 RX ORDER — ACETAMINOPHEN 325 MG
TABLET ORAL
Refills: 0 | Status: ACTIVE | COMMUNITY

## 2025-01-21 ENCOUNTER — TRANSCRIPTION ENCOUNTER (OUTPATIENT)
Age: 65
End: 2025-01-21

## 2025-01-27 ENCOUNTER — APPOINTMENT (OUTPATIENT)
Dept: RADIOLOGY | Facility: CLINIC | Age: 65
End: 2025-01-27
Payer: COMMERCIAL

## 2025-01-27 ENCOUNTER — OUTPATIENT (OUTPATIENT)
Dept: OUTPATIENT SERVICES | Facility: HOSPITAL | Age: 65
LOS: 1 days | End: 2025-01-27
Payer: COMMERCIAL

## 2025-01-27 ENCOUNTER — RESULT REVIEW (OUTPATIENT)
Age: 65
End: 2025-01-27

## 2025-01-27 ENCOUNTER — APPOINTMENT (OUTPATIENT)
Dept: MAMMOGRAPHY | Facility: CLINIC | Age: 65
End: 2025-01-27
Payer: COMMERCIAL

## 2025-01-27 DIAGNOSIS — Z12.39 ENCOUNTER FOR OTHER SCREENING FOR MALIGNANT NEOPLASM OF BREAST: ICD-10-CM

## 2025-01-27 DIAGNOSIS — M85.80 OTHER SPECIFIED DISORDERS OF BONE DENSITY AND STRUCTURE, UNSPECIFIED SITE: ICD-10-CM

## 2025-01-27 PROCEDURE — 77085 DXA BONE DENSITY AXL VRT FX: CPT | Mod: 26

## 2025-01-27 PROCEDURE — 77063 BREAST TOMOSYNTHESIS BI: CPT | Mod: 26

## 2025-01-27 PROCEDURE — 77063 BREAST TOMOSYNTHESIS BI: CPT

## 2025-01-27 PROCEDURE — 77067 SCR MAMMO BI INCL CAD: CPT | Mod: 26

## 2025-01-27 PROCEDURE — 77067 SCR MAMMO BI INCL CAD: CPT

## 2025-01-27 PROCEDURE — 77085 DXA BONE DENSITY AXL VRT FX: CPT

## 2025-02-03 ENCOUNTER — APPOINTMENT (OUTPATIENT)
Dept: GASTROENTEROLOGY | Facility: CLINIC | Age: 65
End: 2025-02-03

## 2025-02-03 VITALS
WEIGHT: 172 LBS | BODY MASS INDEX: 27.64 KG/M2 | DIASTOLIC BLOOD PRESSURE: 72 MMHG | HEIGHT: 66 IN | SYSTOLIC BLOOD PRESSURE: 112 MMHG

## 2025-02-03 DIAGNOSIS — Z12.11 ENCOUNTER FOR SCREENING FOR MALIGNANT NEOPLASM OF COLON: ICD-10-CM

## 2025-02-03 DIAGNOSIS — Z86.0100 PERSONAL HISTORY OF COLON POLYPS, UNSPECIFIED: ICD-10-CM

## 2025-02-03 PROCEDURE — 99203 OFFICE O/P NEW LOW 30 MIN: CPT

## 2025-02-03 RX ORDER — SODIUM SULFATE, POTASSIUM SULFATE AND MAGNESIUM SULFATE 1.6; 3.13; 17.5 G/177ML; G/177ML; G/177ML
17.5-3.13-1.6 SOLUTION ORAL
Qty: 2 | Refills: 0 | Status: ACTIVE | COMMUNITY
Start: 2025-02-03 | End: 1900-01-01

## 2025-03-17 ENCOUNTER — APPOINTMENT (OUTPATIENT)
Dept: GASTROENTEROLOGY | Facility: AMBULATORY MEDICAL SERVICES | Age: 65
End: 2025-03-17
Payer: COMMERCIAL

## 2025-03-17 PROCEDURE — 45378 DIAGNOSTIC COLONOSCOPY: CPT | Mod: 33
